# Patient Record
Sex: FEMALE | Race: WHITE | Employment: UNEMPLOYED
[De-identification: names, ages, dates, MRNs, and addresses within clinical notes are randomized per-mention and may not be internally consistent; named-entity substitution may affect disease eponyms.]

---

## 2022-03-18 PROBLEM — G93.2 IIH (IDIOPATHIC INTRACRANIAL HYPERTENSION): Status: ACTIVE | Noted: 2017-10-04

## 2022-03-18 PROBLEM — E66.01 OBESITY, MORBID (HCC): Status: ACTIVE | Noted: 2018-08-31

## 2022-03-20 PROBLEM — R51.9 HEADACHE AROUND THE EYES: Status: ACTIVE | Noted: 2017-10-04

## 2022-09-12 ENCOUNTER — TELEPHONE (OUTPATIENT)
Dept: FAMILY MEDICINE CLINIC | Facility: CLINIC | Age: 27
End: 2022-09-12

## 2022-09-12 NOTE — TELEPHONE ENCOUNTER
Patient had a New patient OV scheduled , Patient brought her baby and 2 other kids to her appointment. Per GENNA Khan and [facility] : She won't be able to have them all in a room with her, unless they are the ones with the appt. We are still under covid protocols. Per patient she was not advised of this during scheduling .

## 2022-09-27 ENCOUNTER — OFFICE VISIT (OUTPATIENT)
Dept: FAMILY MEDICINE CLINIC | Facility: CLINIC | Age: 27
End: 2022-09-27
Payer: MEDICARE

## 2022-09-27 VITALS
SYSTOLIC BLOOD PRESSURE: 118 MMHG | RESPIRATION RATE: 18 BRPM | TEMPERATURE: 98.3 F | DIASTOLIC BLOOD PRESSURE: 82 MMHG | WEIGHT: 293 LBS | HEART RATE: 86 BPM | BODY MASS INDEX: 47.09 KG/M2 | HEIGHT: 66 IN | OXYGEN SATURATION: 97 %

## 2022-09-27 DIAGNOSIS — Z13.0 SCREENING FOR DEFICIENCY ANEMIA: ICD-10-CM

## 2022-09-27 DIAGNOSIS — Z13.220 ENCOUNTER FOR SCREENING FOR LIPID DISORDER: ICD-10-CM

## 2022-09-27 DIAGNOSIS — Z13.1 SCREENING FOR DIABETES MELLITUS (DM): ICD-10-CM

## 2022-09-27 DIAGNOSIS — R63.5 WEIGHT GAIN: ICD-10-CM

## 2022-09-27 DIAGNOSIS — E66.01 OBESITY, MORBID (HCC): ICD-10-CM

## 2022-09-27 DIAGNOSIS — E01.0 THYROMEGALY: ICD-10-CM

## 2022-09-27 DIAGNOSIS — Z00.00 PREVENTATIVE HEALTH CARE: Primary | ICD-10-CM

## 2022-09-27 PROCEDURE — 99395 PREV VISIT EST AGE 18-39: CPT | Performed by: FAMILY MEDICINE

## 2022-09-27 ASSESSMENT — PATIENT HEALTH QUESTIONNAIRE - PHQ9
SUM OF ALL RESPONSES TO PHQ QUESTIONS 1-9: 0
SUM OF ALL RESPONSES TO PHQ QUESTIONS 1-9: 0
2. FEELING DOWN, DEPRESSED OR HOPELESS: 0
SUM OF ALL RESPONSES TO PHQ QUESTIONS 1-9: 0
SUM OF ALL RESPONSES TO PHQ QUESTIONS 1-9: 0
SUM OF ALL RESPONSES TO PHQ9 QUESTIONS 1 & 2: 0
1. LITTLE INTEREST OR PLEASURE IN DOING THINGS: 0

## 2022-09-27 ASSESSMENT — ENCOUNTER SYMPTOMS
COUGH: 0
DIARRHEA: 0
NAUSEA: 0
SHORTNESS OF BREATH: 0
VOMITING: 0

## 2022-09-27 NOTE — PROGRESS NOTES
Ivis Saul (:  1995) is a 23 y.o. female, NEW patient, here for evaluation of the following chief complaint(s): would like CPE with labs  Other (Issues with weight gain/ possible pcos. )         ASSESSMENT/PLAN:  1. Preventative health care  -     Comprehensive Metabolic Panel; Future  -     CBC with Auto Differential; Future  -     Lipid Panel; Future  2. Screening for diabetes mellitus (DM)  -     Comprehensive Metabolic Panel; Future  3. Encounter for screening for lipid disorder  -     Lipid Panel; Future  4. Screening for deficiency anemia  -     CBC with Auto Differential; Future  5. Thyromegaly  -     TSH; Future  -     US HEAD NECK SOFT TISSUE THYROID; Future  6. Obesity, morbid ([address]  Assessment & Plan:   Uncontrolled, lifestyle modifications recommended  Orders:  -     Testosterone, free, total; Future  7. Weight gain  -     Testosterone, free, total; Future    Fu pending lab/xray results         Subjective   SUBJECTIVE/OBJECTIVE:  Other  This is a chronic problem. The current episode started more than 1 year ago. The problem occurs constantly. The problem has been gradually worsening. Pertinent negatives include no chest pain, chills, coughing, fatigue, nausea or vomiting. Review of Systems   Constitutional:  Negative for chills and fatigue. Respiratory:  Negative for cough and shortness of breath. Cardiovascular:  Negative for chest pain and leg swelling. Gastrointestinal:  Negative for diarrhea, nausea and vomiting. Objective   Physical Exam  Vitals and nursing note reviewed. Constitutional:       General: She is not in acute distress. Appearance: Normal appearance. She is obese. She is not ill-appearing. HENT:      Head: Normocephalic and atraumatic. Right Ear: Tympanic membrane, ear canal and external ear normal.      Left Ear: Tympanic membrane, ear canal and external ear normal.      Nose: Nose normal. No congestion.       Mouth/Throat:      Pharynx: Oropharynx is clear. No posterior oropharyngeal erythema. Eyes:      Extraocular Movements: Extraocular movements intact. Conjunctiva/sclera: Conjunctivae normal.      Pupils: Pupils are equal, round, and reactive to light. Neck:      Comments: Thyroid feels enlarged  Cardiovascular:      Rate and Rhythm: Normal rate and regular rhythm. Pulses: Normal pulses. Heart sounds: Normal heart sounds. No murmur heard. No friction rub. No gallop. Pulmonary:      Effort: Pulmonary effort is normal. No respiratory distress. Breath sounds: Normal breath sounds. No wheezing, rhonchi or rales. Abdominal:      General: Bowel sounds are normal. There is no distension. Palpations: Abdomen is soft. There is no mass. Tenderness: There is no abdominal tenderness. There is no right CVA tenderness, left CVA tenderness, guarding or rebound. Hernia: No hernia is present. Musculoskeletal:         General: No swelling or tenderness. Normal range of motion. Cervical back: Normal range of motion and neck supple. Skin:     General: Skin is warm and dry. Neurological:      General: No focal deficit present. Mental Status: She is alert. Mental status is at baseline. Cranial Nerves: No cranial nerve deficit. Sensory: No sensory deficit. Motor: No weakness. Coordination: Coordination normal.      Gait: Gait normal.      Deep Tendon Reflexes: Reflexes normal.   Psychiatric:         Mood and Affect: Mood normal.         Behavior: Behavior normal.         Thought Content: Thought content normal.                An electronic signature was used to authenticate this note.     --Grady Redmond MD

## 2022-09-29 ENCOUNTER — HOSPITAL ENCOUNTER (OUTPATIENT)
Dept: ULTRASOUND IMAGING | Age: 27
Discharge: HOME | End: 2022-10-02
Payer: MEDICARE

## 2022-09-29 DIAGNOSIS — E01.0 THYROMEGALY: ICD-10-CM

## 2022-09-29 DIAGNOSIS — E04.1 LEFT THYROID NODULE: Primary | ICD-10-CM

## 2022-09-29 PROCEDURE — 76536 US EXAM OF HEAD AND NECK: CPT

## 2022-09-30 DIAGNOSIS — Z13.220 ENCOUNTER FOR SCREENING FOR LIPID DISORDER: ICD-10-CM

## 2022-09-30 DIAGNOSIS — Z13.1 SCREENING FOR DIABETES MELLITUS (DM): ICD-10-CM

## 2022-09-30 DIAGNOSIS — Z13.0 SCREENING FOR DEFICIENCY ANEMIA: ICD-10-CM

## 2022-09-30 DIAGNOSIS — E01.0 THYROMEGALY: ICD-10-CM

## 2022-09-30 DIAGNOSIS — R63.5 WEIGHT GAIN: ICD-10-CM

## 2022-09-30 DIAGNOSIS — E66.01 OBESITY, MORBID (HCC): ICD-10-CM

## 2022-09-30 DIAGNOSIS — Z00.00 PREVENTATIVE HEALTH CARE: ICD-10-CM

## 2022-10-25 ENCOUNTER — TELEPHONE (OUTPATIENT)
Dept: FAMILY MEDICINE CLINIC | Facility: CLINIC | Age: 27
End: 2022-10-25

## 2022-11-17 ENCOUNTER — OFFICE VISIT (OUTPATIENT)
Dept: ENDOCRINOLOGY | Age: 27
End: 2022-11-17
Payer: MEDICARE

## 2022-11-17 VITALS
DIASTOLIC BLOOD PRESSURE: 90 MMHG | SYSTOLIC BLOOD PRESSURE: 128 MMHG | OXYGEN SATURATION: 97 % | WEIGHT: 293 LBS | HEIGHT: 65 IN | BODY MASS INDEX: 48.82 KG/M2 | HEART RATE: 103 BPM

## 2022-11-17 DIAGNOSIS — E04.1 THYROID NODULE: ICD-10-CM

## 2022-11-17 DIAGNOSIS — Z80.8 FAMILY HISTORY OF THYROID CANCER: ICD-10-CM

## 2022-11-17 LAB — TSH W FREE THYROID IF ABNORMAL: 1.3 UIU/ML (ref 0.36–3.74)

## 2022-11-17 PROCEDURE — 99204 OFFICE O/P NEW MOD 45 MIN: CPT | Performed by: INTERNAL MEDICINE

## 2022-11-17 PROCEDURE — G8427 DOCREV CUR MEDS BY ELIG CLIN: HCPCS | Performed by: INTERNAL MEDICINE

## 2022-11-17 PROCEDURE — G8417 CALC BMI ABV UP PARAM F/U: HCPCS | Performed by: INTERNAL MEDICINE

## 2022-11-17 PROCEDURE — 4004F PT TOBACCO SCREEN RCVD TLK: CPT | Performed by: INTERNAL MEDICINE

## 2022-11-17 PROCEDURE — G8484 FLU IMMUNIZE NO ADMIN: HCPCS | Performed by: INTERNAL MEDICINE

## 2022-11-17 PROCEDURE — 10005 FNA BX W/US GDN 1ST LES: CPT | Performed by: INTERNAL MEDICINE

## 2022-11-17 RX ORDER — IBUPROFEN 200 MG
200 TABLET ORAL EVERY 6 HOURS PRN
COMMUNITY

## 2022-11-17 ASSESSMENT — ENCOUNTER SYMPTOMS
CONSTIPATION: 0
DIARRHEA: 1

## 2022-11-17 NOTE — PROGRESS NOTES
Osman Carvalho MD, [facility]  [address], [address] [address]  Phone [phone]          Aníbal Ji is a 33 y.o. female seen 11/17/2022 at the request of Dr. Jaylene Hernandez for the evaluation of left thyroid nodule        ASSESSMENT AND PLAN:    1. Thyroid nodule  I performed an FNA biopsy of the suspicious mid-inferior left lobe nodule today. The specimen will be sent to [facility] for cytology with [facility] genomic sequencing  if needed. Assuming the biopsy is benign, I will have her return for a follow up ultrasound in 4 months to document stability. She has no obvious compressive symptoms at this point which would warrant referral for thyroidectomy. I will assess her thyroid function today. 2. Family history of thyroid cancer  She has a positive family history of thyroid cancer in her brother and maternal grandmother. Her brother received radioactive iodine, indicating that he had a differentiated follicular derived thyroid cancer (papillary or follicular thyroid carcinoma). Procedures:    Limited thyroid ultrasound 11/17/2022: In the mid to inferior left lobe there is a solid, heterogeneous nodule with isoechoic and hypoechoic components measuring 1.84 x 2.27 x 2.68 cm containing multiple punctate echogenic foci suspicious for microcalcifications (TR 5). Examination of the central and lateral cervical compartments reveals no abnormal lymph nodes bilaterally. Thyroid FNA Biopsy Procedure Note:    Informed consent was obtained from the patient. I explained the small risk of bleeding and infection. A timeout was performed. The neck was cleansed using alcohol pads. The skin was anesthetized using 1% lidocaine. 5 passes with a 27-gauge needle were made into the mid-inferior left lobe nodule using ultrasound-guidance. The needle was visualized in the nodule on all attempts.   The material from 3 passes was placed in CytoLyt solution and 2 passes into the [employer] [address] tube. The patient tolerated the procedure well. Post-procedure ultrasound revealed no evidence of swelling or hemorrhage. The biopsy site was covered with a bandaid. The patient was advised to call with swelling, dysphagia, excessive bruising or severe neck pain. Follow-up and Dispositions    Return in about 4 months (around 3/17/2023). HISTORY OF PRESENT ILLNESS:    THYROID NODULE / MULTINODULAR GOITER    Presentation: Thyromegaly noted on examination by primary care physician. Thyroid Cancer Risk Factors:  Her brother had metastatic thyroid carcinoma to cervical lymph nodes requiring radioactive iodine. Her maternal grandmother had thyroid cancer. There is no history of radiation to the head/neck. Symptoms:  She states that the right side of her neck feels enlarged compared to the left. Denies anterior neck pain/pressure. She has had intermittent dysphagia and a dry cough. Denies positional shortness of breath, hoarseness. Imaging:  Thyroid ultrasound 9/29/2021: Right lobe 5.9 x 1.5 x 1.2 cm, homogeneous echotexture, no nodules. Left lobe 6.1 x 2.4 x 2.6 cm. There is a mostly hypoechoic nodule measuring 2.7 x 2.5 cm with irregular margins and sate echogenic areas which may represent calcifications. Limited thyroid ultrasound 11/17/2022: In the mid to inferior left lobe there is a solid, heterogeneous nodule with isoechoic and hypoechoic components measuring 1.84 x 2.27 x 2.68 cm containing multiple punctate echogenic foci suspicious for microcalcifications (TR 5). Examination of the central and lateral cervical compartments reveals no abnormal lymph nodes bilaterally. Labs:      Review of Systems   Constitutional:  Positive for fatigue. Negative for diaphoresis. Weight increased 50 pounds the past year. She had a baby 9/2021. Cardiovascular:  Negative for palpitations.    Gastrointestinal: Positive for diarrhea (after eating). Negative for constipation. Endocrine: Negative for cold intolerance and heat intolerance. Genitourinary:  Positive for menstrual problem (irregular; she has had 3 childtren s/p tubal ligation). Neurological:  Negative for tremors. Psychiatric/Behavioral:  Negative for sleep disturbance. Vital Signs:  BP (!) 128/90   Pulse (!) 103   Ht 5' 5\" (1.651 m)   Wt (!) 316 lb (143.3 kg)   SpO2 97%   BMI 52.59 kg/m²     Wt Readings from Last 3 Encounters:   11/17/22 (!) 316 lb (143.3 kg)   09/27/22 (!) 314 lb (142.4 kg)       Physical Exam  Constitutional:       General: She is not in acute distress. Neck:      Comments: Left lobe of the thyroid gland full compared to the right lobe with a probable palpable nodule measuring 2.5 cm. Cardiovascular:      Rate and Rhythm: Normal rate and regular rhythm. Lymphadenopathy:      Cervical: No cervical adenopathy. Neurological:      Motor: No tremor. Orders Placed This Encounter   Procedures    TSH with Reflex     Standing Status:   Future     Standing Expiration Date:   11/17/2023    DC FINE NEEDLE ASPIRATION BX W/US GDN 1ST LESION         Current Outpatient Medications   Medication Sig Dispense Refill    ibuprofen (ADVIL;MOTRIN) 200 MG tablet Take 200 mg by mouth every 6 hours as needed for Pain       No current facility-administered medications for this visit.

## 2022-11-22 ENCOUNTER — TELEPHONE (OUTPATIENT)
Dept: ENDOCRINOLOGY | Age: 27
End: 2022-11-22

## 2022-11-22 NOTE — TELEPHONE ENCOUNTER
Dr. Laura Ramos? [facility] would like a call back about the patient's biopsy results.  Phone number is [phone]

## 2022-11-23 ENCOUNTER — TELEPHONE (OUTPATIENT)
Dept: ENDOCRINOLOGY | Age: 27
End: 2022-11-23

## 2022-11-23 DIAGNOSIS — C73 PAPILLARY THYROID CARCINOMA (HCC): ICD-10-CM

## 2022-11-23 NOTE — TELEPHONE ENCOUNTER
I spoke with the patient regarding her biopsy results: Papillary thyroid carcinoma. I will refer her to Dr. Lana Jo for total thyroidectomy.

## 2022-11-23 NOTE — TELEPHONE ENCOUNTER
Dr. Nidia Luo notified that Dr. Abdullahi Castaneda received her biopsy results and has spoken to the patient.

## 2022-12-07 ENCOUNTER — OFFICE VISIT (OUTPATIENT)
Dept: ENT CLINIC | Age: 27
End: 2022-12-07
Payer: MEDICARE

## 2022-12-07 VITALS — WEIGHT: 290 LBS | HEIGHT: 66 IN | BODY MASS INDEX: 46.61 KG/M2

## 2022-12-07 DIAGNOSIS — C73 PAPILLARY THYROID CARCINOMA (HCC): Primary | ICD-10-CM

## 2022-12-07 PROCEDURE — 99205 OFFICE O/P NEW HI 60 MIN: CPT | Performed by: OTOLARYNGOLOGY

## 2022-12-07 NOTE — PROGRESS NOTES
Reji Pulido  [facility], [address] [place] [address]  P: [phone]          OFFICE VISIT       12/7/2022    Chief Complaint   Patient presents with    New Patient     Thyroid Concerns. Family history of Thyroid problems. HPI:  Myrna Felix is a 33 y.o. female seen in consultation today for   Chief Complaint   Patient presents with    New Patient     Thyroid Concerns. Family history of Thyroid problems. .     Patient is a 21-year-old female who presents for surgical consultation for left-sided thyroid carcinoma. Endocrine work-up revealed 2 independent solid nodules of the left thyroid gland both measuring greater than 2 cm in size with suspicious sonographic features. Fine-needle aspiration confirmed diagnosis of papillary thyroid carcinoma. Patient is  with 3 children. He works as a . Patient does participate in the [employer]. Of interest she does have a older brother with history of Thyroid Carcinoma. She does not know which type. His presentation was with lymph node metastasis. Current Outpatient Medications:     ibuprofen (ADVIL;MOTRIN) 200 MG tablet, Take 200 mg by mouth every 6 hours as needed for Pain, Disp: , Rfl:     History reviewed. No pertinent past medical history.     Past Surgical History:   Procedure Laterality Date    OTHER SURGICAL HISTORY Left     toe surgery    SHUNT REVISION Right 2016    TUBAL LIGATION          Family History   Problem Relation Age of Onset    No Known Problems Mother     No Known Problems Father     Thyroid Cancer Brother     Thyroid Cancer Maternal Grandmother     Diabetes Paternal Grandmother     Diabetes Paternal Grandfather     Cancer Paternal Grandfather         Social History     Socioeconomic History    Marital status: Single     Spouse name: Not on file    Number of children: Not on file    Years of education: Not on file    Highest education level: Not on file   Occupational History    Not on file   Tobacco Use    Smoking status: Never    Smokeless tobacco: Never   Substance and Sexual Activity    Alcohol use: No    Drug use: Not on file    Sexual activity: Not on file   Other Topics Concern    Not on file   Social History Narrative    Not on file     Social Determinants of Health     Financial Resource Strain: Not on file   Food Insecurity: Not on file   Transportation Needs: Not on file   Physical Activity: Not on file   Stress: Not on file   Social Connections: Not on file   Intimate Partner Violence: Not on file   Housing Stability: Not on file        No Known Allergies     ROS:  The patient was asked specifically about the following. General: Fever, chills, night sweats, unexplained weight loss or weight gain  Eyes: Blurry vision, double vision, floaters, loss or decrease of peripheral vision.    Ears: Difficulty hearing, ringing or buzzing in the ears, ear fullness, frequent ear infections, ear pain or drainage, hearing aid  Nose/Face: Drainage, frequent nosebleeds, sinus pain, pressure or fullness, difficulty breathing through the nose, facial pain, swelling or masses  Mouth: Sores in mouth, tongue soreness, bleeding gums, wears dentures, growths in mouth  Throat: Sore throat, hoarseness, difficulty swallowing, lump in throat, sore throat frequency  Respiratory: Difficulty breathing, frequent cough, productive cough, wheezing, recent abnormal chest X-RAY  Cardiovascular: Blocked arteries, chest pain, shortness of breath, abnormal heart beat, pacemaker  Digestive: Frequent indigestion, burning in throat,chest or stomach after a meal, burning that wakes you in the night, abdominal pain  Neuropsychiatric: Headaches, seizures, facial numbness or tingling, weakness, depressed mood or feeling sad, anxiety, inability to cope  Hematologic/Lymphatic: Anemia, easy bruising or bleeding, swollen glands, transfusions  Allergy/Immunologic: Hay fever, environmental allergies, food allergies, allergy testing, immunodeficiency  Endocrine: Heat or cold intolerance, fatigue, heart racing, profuse sweating, thyroid swelling, over or underactive thyroid, pituitary problems  Other: other problems not mentioned  All systems were negative with the exception of the following pertinent positives: Fatigue, postpartum depression    Vitals: There were no vitals filed for this visit. PHYSICAL EXAM: A comprehensive physical exam was performed in the following manner. Unless otherwise indicated in pertinent findings section below, findings were within normal limits. APPEARANCE:   General assessment for development status, nutritional status, and for pain or distress was performed. COMMUNICATION:   Ability to communicate effectively including vocal quality was assessed. HEAD AND FACE:   General exam of the face and scalp for any gross masses or lesions was performed. Palpation of the sinuses for any sign of pain or tenderness was performed. Facial nerve examination for any facial mimetic muscle asymmetry at rest and with effort was performed. Palpation of the submandibular and parotid glands was performed to assess for asymmetry, nodule or masses. EYES:   Extraocular motility was assessed for medial, lateral, superior and inferior rectus function as well as inferior and superior oblique function. The conjunctiva and eyelids were examined for injection, pallor or swelling. Pupil reactivity and accomodation was assessed. EARS:   External inspection and palpation of the auricular skin and cartilage was performed for lesion or abnormality. Otoscopy of the external auditory and tympanic membranes was performed to assess for patency, induration, erythema, tympanic membrane health and mobility and the presence of any middle ear fluid or abnormality. Speech reception thresholds were grossly assessed through communication at normal conversational levels.      NOSE:   External exam for gross deformity of the nasal bones and upper and lower lateral cartilages was performed. Anterior rhinoscopy was performed to assess the patency of the nasal airway, the anatomy of the nasal septum and turbinates as well as the nasal valve region, and the general mucosal health. The presence of any rhinorrhea and its consistency was noted. Any abnormalities requiring further evaluation by nasal endoscopy will be described below. MOUTH/PHARYNX/LARYNX:   Assessment of the lips, gums, hard/soft palate, tongue, tonsillar fossae and oropharynx for mass, lesions or mucosal abnormalities was performed. The base of tongue and floor of mouth were inspected for lesions and palpated for mass or nodularity. Mirror exam of the larynx to assess for vocal fold mobility and any gross mass or lesion was performed. Mirror exam of the nasopharynx was attempted, to assess for gross mass or lesion of the nasopharynx or any of adenoidal hyperplasia or inflammation. Any abnormalities requiring further examination by flexible endoscopy will be described below. NECK:   Gross inspection of the neck was performed to assess for mass or asymmetry. Palpation of the level I-IV lymph nodes was performed to assess for any grossly enlarged, or abnormally firm lymphadenopathy. The skin of the neck was examined for any induration or swelling and palpated for any crepitus. The larynx and trachea were palpated to assess position in the neck and continuity. The thyroid was palpated to assess for any mass, nodularity or asymmetry. NEURO/PSYCH:   Cranial nerves II-XII were grossly assessed for any weakness or asymmetry. If indicated, CN I was assessed by administration of a standardized smell test (UPSIT). Orientation to person, place and time was assessed. Mood and affect were assessed. RESPIRATION:   Respiratory effort was assessed for increased work of breathing and inspiratory or expiratory wheezing.    Chest expansion was noted for symmetry. CARDIOVASCULAR:   Gross examination for peripheral vascular edema and jugular venous distension was performed. PERTINENT PHYSICAL EXAM FINDINGS - examination for above was grossly within normal limits with exceptions listed below:  Fullness of left thyroid bed. No associated cervical lymphadenopathy appreciated. Demonstrates full vocal range including falsetto. Nasal dorsum significant for allergic salute. Mild bilateral inferior turbinate hypertrophy. Some cobblestoning of posterior pharyngeal mucosa noted. Studies Reviewed  Referral documentation  Thyroid US:  FINDINGS:   Right lobe:  5.9 x 1.5 x 1.2 cm. Homogeneous echotexture. No significant solid   or cystic nodule identified. Left lobe:  6.1 x 2.4 x 2.6 cm. Dominant middle lobe mass. Lesion 1:  2.7 x 2.5 cm mostly hypoechoic mass, irregular margins. Faint   echogenic areas may represent calcifications. FNA Path:   Available in media. Consistent with papillary thyroid carcinoma         ASSESSMENT AND PLAN:     Diagnosis Orders   1. Papillary thyroid carcinoma [facility])             Had extensive discussion with patient and her  today regarding risks benefits and alternatives to thyroidectomy for known papillary thyroid carcinoma. We discussed risks to include temporary or permanent vocal fold paralysis with need for vocal rehabilitation, temporary or permanent hypocalcemia as well as possible postoperative hematoma or seroma. Recommend total thyroidectomy with likely postoperative radioactive iodine ablation. Recommend overnight admission for pain control and monitoring calcium levels as well as drain output. Please schedule accordingly. The patient diagnoses and management plan were discussed at length. They  demonstrated an understanding of the plan and stated that all questions were answered to their satisfaction.        PATIENT EDUCATION / INSTRUCTIONS GIVEN FOR:  Total thyroidectomy    Please CC Dr. Collins Bingham MD. Thank you.

## 2022-12-08 ENCOUNTER — PREP FOR PROCEDURE (OUTPATIENT)
Dept: ENT CLINIC | Age: 27
End: 2022-12-08

## 2022-12-28 ENCOUNTER — PREP FOR PROCEDURE (OUTPATIENT)
Dept: ENT CLINIC | Age: 27
End: 2022-12-28

## 2022-12-28 DIAGNOSIS — C73 PAPILLARY THYROID CARCINOMA (HCC): Primary | ICD-10-CM

## 2023-01-17 ENCOUNTER — TELEPHONE (OUTPATIENT)
Dept: FAMILY MEDICINE CLINIC | Facility: CLINIC | Age: 28
End: 2023-01-17

## 2023-01-17 NOTE — TELEPHONE ENCOUNTER
----- Message from Carol Copeland sent at 1/17/2023  1:38 PM EST -----  Subject: Message to Provider    QUESTIONS  Information for Provider? Pt is calling in and would like info on the new   injection Semaglutide for weight loss. She is wanting to know how she   would go about getting this med. Please call pt to discuss  ---------------------------------------------------------------------------  --------------  [address] [employer] [facility]  [phone]; OK to leave message on voicemail  ---------------------------------------------------------------------------  --------------  SCRIPT ANSWERS  Relationship to Patient?  Self

## 2023-01-19 NOTE — TELEPHONE ENCOUNTER
Appointment schedule with Dr Jones Alvarenga - 01/25--- patient wants to make sure if this RX can be giving to her ??

## 2023-01-20 NOTE — TELEPHONE ENCOUNTER
These meds have a warning about possible causing a  type of thyroid cancer, so they probably not for her. She still needs fasting labs we ordered in September.  We can discuss Adipex if she would like

## 2023-01-25 ENCOUNTER — OFFICE VISIT (OUTPATIENT)
Dept: FAMILY MEDICINE CLINIC | Facility: CLINIC | Age: 28
End: 2023-01-25

## 2023-01-25 VITALS
BODY MASS INDEX: 47.09 KG/M2 | OXYGEN SATURATION: 98 % | TEMPERATURE: 98 F | SYSTOLIC BLOOD PRESSURE: 126 MMHG | HEART RATE: 67 BPM | WEIGHT: 293 LBS | DIASTOLIC BLOOD PRESSURE: 82 MMHG | HEIGHT: 66 IN

## 2023-01-25 DIAGNOSIS — C73 PAPILLARY THYROID CARCINOMA (HCC): ICD-10-CM

## 2023-01-25 DIAGNOSIS — E66.01 OBESITY, MORBID (HCC): Primary | ICD-10-CM

## 2023-01-25 RX ORDER — PHENTERMINE HYDROCHLORIDE 37.5 MG/1
37.5 TABLET ORAL
Qty: 30 TABLET | Refills: 0 | Status: SHIPPED | OUTPATIENT
Start: 2023-01-25 | End: 2023-02-24

## 2023-01-25 ASSESSMENT — ENCOUNTER SYMPTOMS
COUGH: 0
NAUSEA: 0
VOMITING: 0
DIARRHEA: 0
SHORTNESS OF BREATH: 0

## 2023-01-25 ASSESSMENT — PATIENT HEALTH QUESTIONNAIRE - PHQ9
1. LITTLE INTEREST OR PLEASURE IN DOING THINGS: 0
2. FEELING DOWN, DEPRESSED OR HOPELESS: 0
SUM OF ALL RESPONSES TO PHQ QUESTIONS 1-9: 0
SUM OF ALL RESPONSES TO PHQ QUESTIONS 1-9: 0
SUM OF ALL RESPONSES TO PHQ9 QUESTIONS 1 & 2: 0
SUM OF ALL RESPONSES TO PHQ QUESTIONS 1-9: 0
SUM OF ALL RESPONSES TO PHQ QUESTIONS 1-9: 0

## 2023-01-25 NOTE — PROGRESS NOTES
Amina Phan (:  1995) is a 33 y.o. female,Established patient, here for evaluation of the following chief complaint(s):  Weight Loss (DISCUSS WEIGHT LOSS MEDICATION) Was interested in Wegovy/ozempic, but has thyroid cancer, so I am avioding that class of drug with her. ASSESSMENT/PLAN:  1. Obesity, morbid ([address]  Assessment & Plan:   Uncontrolled, lifestyle modifications recommended. Will start Adipex and fu here in 4 weeks. Pt to get on diet plan now and unddeerstands the med will not make her lose weight but can help her stay on diet and not cheat  Orders:  -     phentermine (ADIPEX-P) 37.5 MG tablet; Take 1 tablet by mouth every morning (before breakfast) for 30 days. Max Daily Amount: 37.5 mg, Disp-30 tablet, R-0Normal  2. Papillary thyroid carcinoma ([facility] [address]- fu with endo      Return in about 4 weeks (around 2023) for office followup/recheck, NOT VV. Subjective   SUBJECTIVE/OBJECTIVE:  Other  This is a chronic problem. The current episode started more than 1 year ago. The problem occurs constantly. The problem has been gradually worsening. Pertinent negatives include no chest pain, chills, coughing, fatigue, nausea or vomiting. Review of Systems   Constitutional:  Negative for chills and fatigue. Respiratory:  Negative for cough and shortness of breath. Cardiovascular:  Negative for chest pain and leg swelling. Gastrointestinal:  Negative for diarrhea, nausea and vomiting. Objective   Physical Exam  Vitals and nursing note reviewed. Constitutional:       General: She is not in acute distress. Appearance: Normal appearance. She is obese. HENT:      Head: Normocephalic and atraumatic. Nose: Nose normal.      Mouth/Throat:      Pharynx: Oropharynx is clear. Eyes:      Extraocular Movements: Extraocular movements intact. Conjunctiva/sclera: Conjunctivae normal.   Cardiovascular:      Rate and Rhythm: Normal rate and regular rhythm. Pulses: Normal pulses. Heart sounds: Normal heart sounds. Pulmonary:      Effort: Pulmonary effort is normal.      Breath sounds: Normal breath sounds. Musculoskeletal:         General: No swelling or tenderness. Normal range of motion. Cervical back: Normal range of motion and neck supple. Skin:     General: Skin is warm and dry. Neurological:      General: No focal deficit present. Mental Status: She is alert. Mental status is at baseline. Psychiatric:         Mood and Affect: Mood normal.         Behavior: Behavior normal.              An electronic signature was used to authenticate this note.     --Robbin Luo MD

## 2023-02-14 NOTE — PERIOP NOTE
Patient verified name and . Order for consent found in EHR and matches case posting; patient verifies procedure. Type 2 surgery, PAT phone assessment complete. Orders received. Labs per surgeon: none  Labs per anesthesia protocol: Hgb- Pt instructed to come for lab work (Monday- Friday 8:00 AM- 3:30 PM) prior to surgery day. Pt voiced an understanding. Patient answered medical/surgical history questions at their best of ability. All prior to admission medications documented in [facility]. Patient instructed to take the following medications the day of surgery according to anesthesia guidelines with a small sip of water: none. On the day before surgery please take Acetaminophen 1000mg in the morning and then again before bed. You may substitute for Tylenol 650 mg. Hold all vitamins 7 days prior to surgery and NSAIDS 5 days prior to surgery. Pt instructed to stop Adipex five days prior to surgery. Patient instructed on the following:    > Arrive at A Entrance, time of arrival to be called the day before by 1700  > NPO after midnight, unless otherwise indicated, including gum, mints, and ice chips  > Responsible adult must drive patient to the hospital, stay during surgery, and patient will need supervision 24 hours after anesthesia  > Use antibacterial soap in shower the night before surgery and on the morning of surgery  > All piercings must be removed prior to arrival.    > Leave all valuables (money and jewelry) at home but bring insurance card and ID on DOS.   > You may be required to pay a deductible or co-pay on the day of your procedure. You can pre-pay by calling [phone] if your surgery is at the [facility] or [phone] if your surgery is at the [facility]. > Do not wear make-up, nail polish, lotions, cologne, perfumes, powders, or oil on skin. Artificial nails are not permitted. E-mail with surgical instructions sent to [email or web address] per pt request.

## 2023-02-17 ENCOUNTER — HOSPITAL ENCOUNTER (OUTPATIENT)
Dept: LAB | Age: 28
End: 2023-02-17
Payer: MEDICARE

## 2023-02-17 DIAGNOSIS — Z01.818 PRE-OP TESTING: ICD-10-CM

## 2023-02-17 LAB — HGB BLD-MCNC: 14.8 G/DL (ref 11.7–15.4)

## 2023-02-17 PROCEDURE — 36415 COLL VENOUS BLD VENIPUNCTURE: CPT

## 2023-02-17 PROCEDURE — 85018 HEMOGLOBIN: CPT

## 2023-02-19 ENCOUNTER — ANESTHESIA EVENT (OUTPATIENT)
Dept: SURGERY | Age: 28
End: 2023-02-19
Payer: MEDICARE

## 2023-02-20 ENCOUNTER — HOSPITAL ENCOUNTER (OUTPATIENT)
Age: 28
Setting detail: OBSERVATION
Discharge: HOME | End: 2023-02-21
Attending: OTOLARYNGOLOGY | Admitting: OTOLARYNGOLOGY
Payer: MEDICARE

## 2023-02-20 ENCOUNTER — ANESTHESIA (OUTPATIENT)
Dept: SURGERY | Age: 28
End: 2023-02-20
Payer: MEDICARE

## 2023-02-20 DIAGNOSIS — C73 PAPILLARY THYROID CARCINOMA (HCC): ICD-10-CM

## 2023-02-20 DIAGNOSIS — Z01.818 PRE-OP TESTING: Primary | ICD-10-CM

## 2023-02-20 DIAGNOSIS — G89.18 POST-OP PAIN: ICD-10-CM

## 2023-02-20 LAB
ABO + RH BLD: NORMAL
BLOOD GROUP ANTIBODIES SERPL: NORMAL
CALCIUM SERPL-MCNC: 8.4 MG/DL (ref 8.3–10.4)
PTH-INTACT SERPL-MCNC: 80.1 PG/ML (ref 18.5–88)
SPECIMEN EXP DATE BLD: NORMAL

## 2023-02-20 PROCEDURE — 86901 BLOOD TYPING SEROLOGIC RH(D): CPT

## 2023-02-20 PROCEDURE — 88307 TISSUE EXAM BY PATHOLOGIST: CPT

## 2023-02-20 PROCEDURE — 7100000001 HC PACU RECOVERY - ADDTL 15 MIN: Performed by: OTOLARYNGOLOGY

## 2023-02-20 PROCEDURE — 60240 REMOVAL OF THYROID: CPT | Performed by: OTOLARYNGOLOGY

## 2023-02-20 PROCEDURE — 3600000004 HC SURGERY LEVEL 4 BASE: Performed by: OTOLARYNGOLOGY

## 2023-02-20 PROCEDURE — 2500000003 HC RX 250 WO HCPCS: Performed by: ANESTHESIOLOGY

## 2023-02-20 PROCEDURE — 3600000014 HC SURGERY LEVEL 4 ADDTL 15MIN: Performed by: OTOLARYNGOLOGY

## 2023-02-20 PROCEDURE — 6360000002 HC RX W HCPCS: Performed by: ANESTHESIOLOGY

## 2023-02-20 PROCEDURE — 83970 ASSAY OF PARATHORMONE: CPT

## 2023-02-20 PROCEDURE — 2720000010 HC SURG SUPPLY STERILE: Performed by: OTOLARYNGOLOGY

## 2023-02-20 PROCEDURE — 2500000003 HC RX 250 WO HCPCS: Performed by: NURSE ANESTHETIST, CERTIFIED REGISTERED

## 2023-02-20 PROCEDURE — 6360000002 HC RX W HCPCS: Performed by: OTOLARYNGOLOGY

## 2023-02-20 PROCEDURE — 6360000002 HC RX W HCPCS: Performed by: NURSE ANESTHETIST, CERTIFIED REGISTERED

## 2023-02-20 PROCEDURE — 3700000000 HC ANESTHESIA ATTENDED CARE: Performed by: OTOLARYNGOLOGY

## 2023-02-20 PROCEDURE — 6370000000 HC RX 637 (ALT 250 FOR IP): Performed by: ANESTHESIOLOGY

## 2023-02-20 PROCEDURE — 2500000003 HC RX 250 WO HCPCS: Performed by: OTOLARYNGOLOGY

## 2023-02-20 PROCEDURE — 3700000001 HC ADD 15 MINUTES (ANESTHESIA): Performed by: OTOLARYNGOLOGY

## 2023-02-20 PROCEDURE — G0378 HOSPITAL OBSERVATION PER HR: HCPCS

## 2023-02-20 PROCEDURE — 2580000003 HC RX 258: Performed by: ANESTHESIOLOGY

## 2023-02-20 PROCEDURE — 7100000000 HC PACU RECOVERY - FIRST 15 MIN: Performed by: OTOLARYNGOLOGY

## 2023-02-20 PROCEDURE — 2709999900 HC NON-CHARGEABLE SUPPLY: Performed by: OTOLARYNGOLOGY

## 2023-02-20 RX ORDER — DIPHENHYDRAMINE HYDROCHLORIDE 50 MG/ML
12.5 INJECTION INTRAMUSCULAR; INTRAVENOUS
Status: DISCONTINUED | OUTPATIENT
Start: 2023-02-20 | End: 2023-02-20 | Stop reason: HOSPADM

## 2023-02-20 RX ORDER — OXYCODONE HYDROCHLORIDE 5 MG/1
10 TABLET ORAL EVERY 4 HOURS PRN
Status: DISCONTINUED | OUTPATIENT
Start: 2023-02-20 | End: 2023-02-21 | Stop reason: HOSPADM

## 2023-02-20 RX ORDER — PROCHLORPERAZINE EDISYLATE 5 MG/ML
5 INJECTION INTRAMUSCULAR; INTRAVENOUS
Status: COMPLETED | OUTPATIENT
Start: 2023-02-20 | End: 2023-02-20

## 2023-02-20 RX ORDER — HYDROMORPHONE HCL 110MG/55ML
PATIENT CONTROLLED ANALGESIA SYRINGE INTRAVENOUS PRN
Status: DISCONTINUED | OUTPATIENT
Start: 2023-02-20 | End: 2023-02-20 | Stop reason: SDUPTHER

## 2023-02-20 RX ORDER — LIDOCAINE HYDROCHLORIDE 10 MG/ML
1 INJECTION, SOLUTION INFILTRATION; PERINEURAL
Status: COMPLETED | OUTPATIENT
Start: 2023-02-20 | End: 2023-02-20

## 2023-02-20 RX ORDER — CEFUROXIME AXETIL 250 MG/1
500 TABLET ORAL EVERY 12 HOURS SCHEDULED
Status: DISCONTINUED | OUTPATIENT
Start: 2023-02-20 | End: 2023-02-21 | Stop reason: HOSPADM

## 2023-02-20 RX ORDER — KETAMINE HYDROCHLORIDE 50 MG/ML
INJECTION, SOLUTION, CONCENTRATE INTRAMUSCULAR; INTRAVENOUS PRN
Status: DISCONTINUED | OUTPATIENT
Start: 2023-02-20 | End: 2023-02-20 | Stop reason: SDUPTHER

## 2023-02-20 RX ORDER — CALCIUM CARBONATE 500(1250)
1000 TABLET ORAL 2 TIMES DAILY
Status: DISCONTINUED | OUTPATIENT
Start: 2023-02-20 | End: 2023-02-21 | Stop reason: HOSPADM

## 2023-02-20 RX ORDER — LEVOTHYROXINE SODIUM 0.07 MG/1
150 TABLET ORAL DAILY
Status: DISCONTINUED | OUTPATIENT
Start: 2023-02-21 | End: 2023-02-21 | Stop reason: HOSPADM

## 2023-02-20 RX ORDER — APREPITANT 40 MG/1
40 CAPSULE ORAL ONCE
Status: COMPLETED | OUTPATIENT
Start: 2023-02-20 | End: 2023-02-20

## 2023-02-20 RX ORDER — SODIUM CHLORIDE, SODIUM LACTATE, POTASSIUM CHLORIDE, CALCIUM CHLORIDE 600; 310; 30; 20 MG/100ML; MG/100ML; MG/100ML; MG/100ML
INJECTION, SOLUTION INTRAVENOUS CONTINUOUS
Status: DISCONTINUED | OUTPATIENT
Start: 2023-02-20 | End: 2023-02-20 | Stop reason: HOSPADM

## 2023-02-20 RX ORDER — SODIUM CHLORIDE 0.9 % (FLUSH) 0.9 %
5-40 SYRINGE (ML) INJECTION PRN
Status: DISCONTINUED | OUTPATIENT
Start: 2023-02-20 | End: 2023-02-21 | Stop reason: HOSPADM

## 2023-02-20 RX ORDER — SODIUM CHLORIDE 0.9 % (FLUSH) 0.9 %
5-40 SYRINGE (ML) INJECTION PRN
Status: DISCONTINUED | OUTPATIENT
Start: 2023-02-20 | End: 2023-02-20 | Stop reason: HOSPADM

## 2023-02-20 RX ORDER — SODIUM CHLORIDE 0.9 % (FLUSH) 0.9 %
5-40 SYRINGE (ML) INJECTION EVERY 12 HOURS SCHEDULED
Status: DISCONTINUED | OUTPATIENT
Start: 2023-02-20 | End: 2023-02-21 | Stop reason: HOSPADM

## 2023-02-20 RX ORDER — EPINEPHRINE 1 MG/ML(1)
AMPUL (ML) INJECTION PRN
Status: DISCONTINUED | OUTPATIENT
Start: 2023-02-20 | End: 2023-02-20 | Stop reason: HOSPADM

## 2023-02-20 RX ORDER — ONDANSETRON 2 MG/ML
4 INJECTION INTRAMUSCULAR; INTRAVENOUS EVERY 6 HOURS PRN
Status: DISCONTINUED | OUTPATIENT
Start: 2023-02-20 | End: 2023-02-21 | Stop reason: HOSPADM

## 2023-02-20 RX ORDER — LIDOCAINE HYDROCHLORIDE AND EPINEPHRINE 10; 10 MG/ML; UG/ML
INJECTION, SOLUTION INFILTRATION; PERINEURAL PRN
Status: DISCONTINUED | OUTPATIENT
Start: 2023-02-20 | End: 2023-02-20 | Stop reason: HOSPADM

## 2023-02-20 RX ORDER — DEXTROSE MONOHYDRATE 100 MG/ML
INJECTION, SOLUTION INTRAVENOUS CONTINUOUS PRN
Status: DISCONTINUED | OUTPATIENT
Start: 2023-02-20 | End: 2023-02-20 | Stop reason: HOSPADM

## 2023-02-20 RX ORDER — SODIUM CHLORIDE 9 MG/ML
INJECTION, SOLUTION INTRAVENOUS PRN
Status: DISCONTINUED | OUTPATIENT
Start: 2023-02-20 | End: 2023-02-20 | Stop reason: HOSPADM

## 2023-02-20 RX ORDER — SODIUM CHLORIDE 0.9 % (FLUSH) 0.9 %
5-40 SYRINGE (ML) INJECTION EVERY 12 HOURS SCHEDULED
Status: DISCONTINUED | OUTPATIENT
Start: 2023-02-20 | End: 2023-02-20 | Stop reason: HOSPADM

## 2023-02-20 RX ORDER — FENTANYL CITRATE 50 UG/ML
100 INJECTION, SOLUTION INTRAMUSCULAR; INTRAVENOUS
Status: DISCONTINUED | OUTPATIENT
Start: 2023-02-20 | End: 2023-02-20 | Stop reason: HOSPADM

## 2023-02-20 RX ORDER — SODIUM CHLORIDE 9 MG/ML
INJECTION, SOLUTION INTRAVENOUS PRN
Status: DISCONTINUED | OUTPATIENT
Start: 2023-02-20 | End: 2023-02-21 | Stop reason: HOSPADM

## 2023-02-20 RX ORDER — PROPOFOL 10 MG/ML
INJECTION, EMULSION INTRAVENOUS PRN
Status: DISCONTINUED | OUTPATIENT
Start: 2023-02-20 | End: 2023-02-20 | Stop reason: SDUPTHER

## 2023-02-20 RX ORDER — EPHEDRINE SULFATE/0.9% NACL/PF 50 MG/5 ML
SYRINGE (ML) INTRAVENOUS PRN
Status: DISCONTINUED | OUTPATIENT
Start: 2023-02-20 | End: 2023-02-20 | Stop reason: SDUPTHER

## 2023-02-20 RX ORDER — ONDANSETRON 4 MG/1
4 TABLET, ORALLY DISINTEGRATING ORAL EVERY 8 HOURS PRN
Status: DISCONTINUED | OUTPATIENT
Start: 2023-02-20 | End: 2023-02-21 | Stop reason: HOSPADM

## 2023-02-20 RX ORDER — ONDANSETRON 2 MG/ML
INJECTION INTRAMUSCULAR; INTRAVENOUS PRN
Status: DISCONTINUED | OUTPATIENT
Start: 2023-02-20 | End: 2023-02-20 | Stop reason: SDUPTHER

## 2023-02-20 RX ORDER — OXYCODONE HYDROCHLORIDE 5 MG/1
10 TABLET ORAL ONCE
Status: COMPLETED | OUTPATIENT
Start: 2023-02-20 | End: 2023-02-20

## 2023-02-20 RX ORDER — ACETAMINOPHEN 500 MG
1000 TABLET ORAL ONCE
Status: COMPLETED | OUTPATIENT
Start: 2023-02-20 | End: 2023-02-20

## 2023-02-20 RX ORDER — SODIUM CHLORIDE, SODIUM LACTATE, POTASSIUM CHLORIDE, CALCIUM CHLORIDE 600; 310; 30; 20 MG/100ML; MG/100ML; MG/100ML; MG/100ML
INJECTION, SOLUTION INTRAVENOUS CONTINUOUS
Status: DISCONTINUED | OUTPATIENT
Start: 2023-02-20 | End: 2023-02-21 | Stop reason: HOSPADM

## 2023-02-20 RX ORDER — LIDOCAINE HYDROCHLORIDE 20 MG/ML
INJECTION, SOLUTION EPIDURAL; INFILTRATION; INTRACAUDAL; PERINEURAL PRN
Status: DISCONTINUED | OUTPATIENT
Start: 2023-02-20 | End: 2023-02-20 | Stop reason: SDUPTHER

## 2023-02-20 RX ORDER — POLYETHYLENE GLYCOL 3350 17 G/17G
17 POWDER, FOR SOLUTION ORAL DAILY PRN
Status: DISCONTINUED | OUTPATIENT
Start: 2023-02-20 | End: 2023-02-21 | Stop reason: HOSPADM

## 2023-02-20 RX ORDER — CALCITRIOL 0.25 UG/1
0.25 CAPSULE, LIQUID FILLED ORAL DAILY
Status: DISCONTINUED | OUTPATIENT
Start: 2023-02-21 | End: 2023-02-21 | Stop reason: HOSPADM

## 2023-02-20 RX ORDER — DEXAMETHASONE SODIUM PHOSPHATE 10 MG/ML
INJECTION, SOLUTION INTRAMUSCULAR; INTRAVENOUS PRN
Status: DISCONTINUED | OUTPATIENT
Start: 2023-02-20 | End: 2023-02-20 | Stop reason: SDUPTHER

## 2023-02-20 RX ORDER — MIDAZOLAM HYDROCHLORIDE 1 MG/ML
2 INJECTION INTRAMUSCULAR; INTRAVENOUS
Status: COMPLETED | OUTPATIENT
Start: 2023-02-20 | End: 2023-02-20

## 2023-02-20 RX ORDER — OXYCODONE HYDROCHLORIDE 5 MG/1
5 TABLET ORAL
Status: DISCONTINUED | OUTPATIENT
Start: 2023-02-20 | End: 2023-02-20 | Stop reason: HOSPADM

## 2023-02-20 RX ORDER — SUCCINYLCHOLINE/SOD CL,ISO/PF 200MG/10ML
SYRINGE (ML) INTRAVENOUS PRN
Status: DISCONTINUED | OUTPATIENT
Start: 2023-02-20 | End: 2023-02-20 | Stop reason: SDUPTHER

## 2023-02-20 RX ORDER — OXYCODONE HYDROCHLORIDE 5 MG/1
5 TABLET ORAL EVERY 4 HOURS PRN
Status: DISCONTINUED | OUTPATIENT
Start: 2023-02-20 | End: 2023-02-21 | Stop reason: HOSPADM

## 2023-02-20 RX ADMIN — MIDAZOLAM 2 MG: 1 INJECTION INTRAMUSCULAR; INTRAVENOUS at 13:33

## 2023-02-20 RX ADMIN — KETAMINE HYDROCHLORIDE 40 MG: 50 INJECTION, SOLUTION INTRAMUSCULAR; INTRAVENOUS at 14:45

## 2023-02-20 RX ADMIN — LIDOCAINE HYDROCHLORIDE 1 ML: 10 INJECTION, SOLUTION INFILTRATION; PERINEURAL at 13:35

## 2023-02-20 RX ADMIN — Medication 200 MG: at 14:09

## 2023-02-20 RX ADMIN — SODIUM CHLORIDE, SODIUM LACTATE, POTASSIUM CHLORIDE, AND CALCIUM CHLORIDE: 600; 310; 30; 20 INJECTION, SOLUTION INTRAVENOUS at 14:00

## 2023-02-20 RX ADMIN — PHENYLEPHRINE HYDROCHLORIDE 100 MCG: 0.1 INJECTION, SOLUTION INTRAVENOUS at 14:57

## 2023-02-20 RX ADMIN — Medication 3000 MG: at 14:31

## 2023-02-20 RX ADMIN — PROPOFOL 300 MG: 10 INJECTION, EMULSION INTRAVENOUS at 14:09

## 2023-02-20 RX ADMIN — OXYCODONE HYDROCHLORIDE 10 MG: 5 TABLET ORAL at 19:15

## 2023-02-20 RX ADMIN — APREPITANT 40 MG: 40 CAPSULE ORAL at 11:45

## 2023-02-20 RX ADMIN — KETAMINE HYDROCHLORIDE 20 MG: 50 INJECTION, SOLUTION INTRAMUSCULAR; INTRAVENOUS at 16:40

## 2023-02-20 RX ADMIN — PHENYLEPHRINE HYDROCHLORIDE 100 MCG: 0.1 INJECTION, SOLUTION INTRAVENOUS at 16:13

## 2023-02-20 RX ADMIN — DEXAMETHASONE SODIUM PHOSPHATE 10 MG: 10 INJECTION INTRAMUSCULAR; INTRAVENOUS at 14:55

## 2023-02-20 RX ADMIN — HYDROMORPHONE HYDROCHLORIDE 0.5 MG: 2 INJECTION INTRAMUSCULAR; INTRAVENOUS; SUBCUTANEOUS at 15:19

## 2023-02-20 RX ADMIN — ONDANSETRON 4 MG: 2 INJECTION INTRAMUSCULAR; INTRAVENOUS at 14:55

## 2023-02-20 RX ADMIN — HYDROMORPHONE HYDROCHLORIDE 0.5 MG: 1 INJECTION, SOLUTION INTRAMUSCULAR; INTRAVENOUS; SUBCUTANEOUS at 18:42

## 2023-02-20 RX ADMIN — Medication 10 MG: at 15:53

## 2023-02-20 RX ADMIN — ACETAMINOPHEN 1000 MG: 500 TABLET, FILM COATED ORAL at 11:45

## 2023-02-20 RX ADMIN — PHENYLEPHRINE HYDROCHLORIDE 50 MCG: 0.1 INJECTION, SOLUTION INTRAVENOUS at 16:55

## 2023-02-20 RX ADMIN — HYDROMORPHONE HYDROCHLORIDE 0.5 MG: 2 INJECTION INTRAMUSCULAR; INTRAVENOUS; SUBCUTANEOUS at 14:45

## 2023-02-20 RX ADMIN — SODIUM CHLORIDE, SODIUM LACTATE, POTASSIUM CHLORIDE, AND CALCIUM CHLORIDE: 600; 310; 30; 20 INJECTION, SOLUTION INTRAVENOUS at 16:30

## 2023-02-20 RX ADMIN — HYDROMORPHONE HYDROCHLORIDE 0.5 MG: 1 INJECTION, SOLUTION INTRAMUSCULAR; INTRAVENOUS; SUBCUTANEOUS at 18:30

## 2023-02-20 RX ADMIN — PHENYLEPHRINE HYDROCHLORIDE 100 MCG: 0.1 INJECTION, SOLUTION INTRAVENOUS at 15:02

## 2023-02-20 RX ADMIN — LIDOCAINE HYDROCHLORIDE 100 MG: 20 INJECTION, SOLUTION EPIDURAL; INFILTRATION; INTRACAUDAL; PERINEURAL at 14:09

## 2023-02-20 RX ADMIN — KETAMINE HYDROCHLORIDE 20 MG: 50 INJECTION, SOLUTION INTRAMUSCULAR; INTRAVENOUS at 15:50

## 2023-02-20 RX ADMIN — PROCHLORPERAZINE EDISYLATE 5 MG: 5 INJECTION INTRAMUSCULAR; INTRAVENOUS at 19:00

## 2023-02-20 RX ADMIN — HYDROMORPHONE HYDROCHLORIDE 1 MG: 2 INJECTION INTRAMUSCULAR; INTRAVENOUS; SUBCUTANEOUS at 14:09

## 2023-02-20 RX ADMIN — SODIUM CHLORIDE, SODIUM LACTATE, POTASSIUM CHLORIDE, AND CALCIUM CHLORIDE: 600; 310; 30; 20 INJECTION, SOLUTION INTRAVENOUS at 13:33

## 2023-02-20 RX ADMIN — SODIUM CHLORIDE, SODIUM LACTATE, POTASSIUM CHLORIDE, AND CALCIUM CHLORIDE: 600; 310; 30; 20 INJECTION, SOLUTION INTRAVENOUS at 14:48

## 2023-02-20 RX ADMIN — PHENYLEPHRINE HYDROCHLORIDE 50 MCG: 0.1 INJECTION, SOLUTION INTRAVENOUS at 16:25

## 2023-02-20 RX ADMIN — Medication 10 MG: at 14:57

## 2023-02-20 ASSESSMENT — PAIN SCALES - GENERAL
PAINLEVEL_OUTOF10: 8
PAINLEVEL_OUTOF10: 0
PAINLEVEL_OUTOF10: 8
PAINLEVEL_OUTOF10: 8
PAINLEVEL_OUTOF10: 0
PAINLEVEL_OUTOF10: 3
PAINLEVEL_OUTOF10: 8

## 2023-02-20 ASSESSMENT — PAIN DESCRIPTION - PAIN TYPE
TYPE: SURGICAL PAIN
TYPE: ACUTE PAIN;SURGICAL PAIN

## 2023-02-20 ASSESSMENT — PAIN DESCRIPTION - LOCATION
LOCATION: INCISION;NECK
LOCATION: INCISION

## 2023-02-20 NOTE — ANESTHESIA PRE PROCEDURE
Department of Anesthesiology  Preprocedure Note       Name:  Wilmar Reece   Age:  33 y.o.  :  1995                                          MRN:  [id number]         Date:  2023      Surgeon: David Hood):  Aravind Pendleton MD    Procedure: Procedure(s):  THYROIDECTOMY metronic nims (nerve monitoring) with laryngeal nerve monitoring    Medications prior to admission:   Prior to Admission medications    Medication Sig Start Date End Date Taking? Authorizing Provider   phentermine (ADIPEX-P) 37.5 MG tablet Take 1 tablet by mouth every morning (before breakfast) for 30 days.  Max Daily Amount: 37.5 mg 23  Juan Francisco Underwood MD   ibuprofen (ADVIL;MOTRIN) 200 MG tablet Take 200 mg by mouth every 6 hours as needed for Pain    Historical Provider, MD       Current medications:    Current Facility-Administered Medications   Medication Dose Route Frequency Provider Last Rate Last Admin    lidocaine 1 % injection 1 mL  1 mL IntraDERmal Once PRN Nicolasa Bingham MD        acetaminophen (TYLENOL) tablet 1,000 mg  1,000 mg Oral Once Nicolasa Bingham MD        fentaNYL (SUBLIMAZE) injection 100 mcg  100 mcg IntraVENous Once PRN Nicolasa Bingham MD        lactated ringers IV soln infusion   IntraVENous Continuous Nicolasa Bingham MD        sodium chloride flush 0.9 % injection 5-40 mL  5-40 mL IntraVENous 2 times per day Nicolasa Bingham MD        sodium chloride flush 0.9 % injection 5-40 mL  5-40 mL IntraVENous PRN Nicolasa Bingham MD        0.9 % sodium chloride infusion   IntraVENous PRN Nicolasa Bingham MD        midazolam (VERSED) injection 2 mg/2mL  2 mg IntraVENous Once PRN Nicolasa Bingham MD        aprepitant (EMEND) capsule 40 mg  40 mg Oral Once Nicolasa Bingham MD           Allergies:  No Known Allergies    Problem List:    Patient Active Problem List   Diagnosis Code    Obesity, morbid ([facility] E66.01    IIH (idiopathic intracranial hypertension) G93.2    Pseudotumor cerebri syndrome G93.2    Pseudotumor cerebri G93.2    Headache around the eyes R51.9    Thyroid nodule E04.1    Family history of thyroid cancer Z80.8    Papillary thyroid carcinoma ([facility] [address] C73       Past Medical History:        Diagnosis Date    PIH (pregnancy induced hypertension)     Pseudotumor     Thyroid cancer ([facility] [address]     STAGE 2 - SURGERY 2/20       Past Surgical History:        Procedure Laterality Date    OTHER SURGICAL HISTORY Left     toe surgery    SHUNT REVISION Right 2016    TUBAL LIGATION         Social History:    Social History     Tobacco Use    Smoking status: Never    Smokeless tobacco: Never   Substance Use Topics    Alcohol use: No                                Counseling given: Not Answered      Vital Signs (Current):   Vitals:    02/14/23 0923 02/20/23 1058   BP:  (!) 157/108   Pulse:  72   Resp:  18   Temp:  97.9 °F (36.6 °C)   TempSrc:  Temporal   SpO2:  96%   Weight: 290 lb (131.5 kg) (!) 331 lb 9.6 oz (150.4 kg)   Height: 5' 5\" (1.651 m)                                               BP Readings from Last 3 Encounters:   02/20/23 (!) 157/108   01/25/23 126/82   11/17/22 (!) 128/90       NPO Status: Time of last liquid consumption: 0900                        Time of last solid consumption: 2300                        Date of last liquid consumption: 02/20/23                        Date of last solid food consumption: 02/19/23    BMI:   Wt Readings from Last 3 Encounters:   02/20/23 (!) 331 lb 9.6 oz (150.4 kg)   01/25/23 (!) 332 lb 11.2 oz (150.9 kg)   12/07/22 290 lb (131.5 kg)     Body mass index is 55.18 kg/m². CBC:   Lab Results   Component Value Date/Time    HGB 14.8 02/17/2023 02:00 PM       CMP: No results found for: NA, K, CL, CO2, BUN, CREATININE, GFRAA, AGRATIO, LABGLOM, GLUCOSE, GLU, PROT, CALCIUM, BILITOT, ALKPHOS, AST, ALT    POC Tests: No results for input(s): POCGLU, POCNA, POCK, POCCL, POCBUN, POCHEMO, POCHCT in the last 72 hours.     Coags: No results found for: PROTIME, INR, APTT    HCG (If Applicable): No results found for: PREGTESTUR, PREGSERUM, HCG, HCGQUANT     ABGs: No results found for: PHART, PO2ART, ONQ2EIE, JTS1NGU, BEART, Y7ECLBCX     Type & Screen (If Applicable):  No results found for: LABABO, LABRH    Drug/Infectious Status (If Applicable):  No results found for: HIV, HEPCAB    COVID-19 Screening (If Applicable): No results found for: COVID19        Anesthesia Evaluation  Patient summary reviewed and Nursing notes reviewed no history of anesthetic complications:   Airway: Mallampati: II  TM distance: >3 FB   Neck ROM: full  Comment: goiter  Mouth opening: > = 3 FB   Dental: normal exam         Pulmonary:Negative Pulmonary ROS breath sounds clear to auscultation                             Cardiovascular:  Exercise tolerance: good (>4 METS),   (+) hypertension:,         Rhythm: regular  Rate: normal                    Neuro/Psych:   (+) headaches:,              ROS comment: Pseudotumor  - has well functioning  Shunt - denied severe headache and neuro sx  GI/Hepatic/Renal:   (+) morbid obesity (super morbid obesity - BMI 55)          Endo/Other:                      ROS comment: Goiter due to papillary thyroid cancer - no obstructive symptoms lying flat Abdominal:             Vascular: Other Findings:           Anesthesia Plan      general     ASA 4     (GETA, NIMS, glidescope )  Induction: intravenous. Anesthetic plan and risks discussed with patient and spouse.                         Sabi Perez MD   2/20/2023

## 2023-02-20 NOTE — PROGRESS NOTES
Pt   Onur) notified per Dr. Bragg Brought \"Surgery is progressing well and we are half way through with the procedure. We had a slow start because of positioning. \"  He verbalized understanding and thanked me for the update.

## 2023-02-20 NOTE — H&P
HPI:  Arslan Ford is a 33 y.o. female seen in consultation today for        Chief Complaint   Patient presents with    New Patient       Thyroid Concerns. Family history of Thyroid problems. .      Patient is a 60-year-old female who presents for surgical consultation for left-sided thyroid carcinoma. Endocrine work-up revealed 2 independent solid nodules of the left thyroid gland both measuring greater than 2 cm in size with suspicious sonographic features. Fine-needle aspiration confirmed diagnosis of papillary thyroid carcinoma. Patient is  with 3 children. He works as a . Patient does participate in the [employer]. Of interest she does have a older brother with history of Thyroid Carcinoma. She does not know which type. His presentation was with lymph node metastasis. Current Medication      Current Outpatient Medications:     ibuprofen (ADVIL;MOTRIN) 200 MG tablet, Take 200 mg by mouth every 6 hours as needed for Pain, Disp: , Rfl:         Past Medical History   History reviewed. No pertinent past medical history.         Past Surgical History         Past Surgical History:   Procedure Laterality Date    OTHER SURGICAL HISTORY Left       toe surgery    SHUNT REVISION Right 2016    TUBAL LIGATION                Family History         Family History   Problem Relation Age of Onset    No Known Problems Mother      No Known Problems Father      Thyroid Cancer Brother      Thyroid Cancer Maternal Grandmother      Diabetes Paternal Grandmother      Diabetes Paternal Grandfather      Cancer Paternal Grandfather              Social History               Socioeconomic History    Marital status: Single       Spouse name: Not on file    Number of children: Not on file    Years of education: Not on file    Highest education level: Not on file   Occupational History    Not on file   Tobacco Use    Smoking status: Never    Smokeless tobacco: Never   Substance and Sexual Activity Alcohol use: No    Drug use: Not on file    Sexual activity: Not on file   Other Topics Concern    Not on file   Social History Narrative    Not on file      Social Determinants of Health      Financial Resource Strain: Not on file   Food Insecurity: Not on file   Transportation Needs: Not on file   Physical Activity: Not on file   Stress: Not on file   Social Connections: Not on file   Intimate Partner Violence: Not on file   Housing Stability: Not on file            No Known Allergies      ROS:  The patient was asked specifically about the following. General: Fever, chills, night sweats, unexplained weight loss or weight gain  Eyes: Blurry vision, double vision, floaters, loss or decrease of peripheral vision.    Ears: Difficulty hearing, ringing or buzzing in the ears, ear fullness, frequent ear infections, ear pain or drainage, hearing aid  Nose/Face: Drainage, frequent nosebleeds, sinus pain, pressure or fullness, difficulty breathing through the nose, facial pain, swelling or masses  Mouth: Sores in mouth, tongue soreness, bleeding gums, wears dentures, growths in mouth  Throat: Sore throat, hoarseness, difficulty swallowing, lump in throat, sore throat frequency  Respiratory: Difficulty breathing, frequent cough, productive cough, wheezing, recent abnormal chest X-RAY  Cardiovascular: Blocked arteries, chest pain, shortness of breath, abnormal heart beat, pacemaker  Digestive: Frequent indigestion, burning in throat,chest or stomach after a meal, burning that wakes you in the night, abdominal pain  Neuropsychiatric: Headaches, seizures, facial numbness or tingling, weakness, depressed mood or feeling sad, anxiety, inability to cope  Hematologic/Lymphatic: Anemia, easy bruising or bleeding, swollen glands, transfusions  Allergy/Immunologic: Hay fever, environmental allergies, food allergies, allergy testing, immunodeficiency  Endocrine: Heat or cold intolerance, fatigue, heart racing, profuse sweating, thyroid swelling, over or underactive thyroid, pituitary problems  Other: other problems not mentioned  All systems were negative with the exception of the following pertinent positives: Fatigue, postpartum depression     Vitals: There were no vitals filed for this visit. PHYSICAL EXAM: A comprehensive physical exam was performed in the following manner. Unless otherwise indicated in pertinent findings section below, findings were within normal limits. APPEARANCE:   General assessment for development status, nutritional status, and for pain or distress was performed. COMMUNICATION:   Ability to communicate effectively including vocal quality was assessed. HEAD AND FACE:   General exam of the face and scalp for any gross masses or lesions was performed. Palpation of the sinuses for any sign of pain or tenderness was performed. Facial nerve examination for any facial mimetic muscle asymmetry at rest and with effort was performed. Palpation of the submandibular and parotid glands was performed to assess for asymmetry, nodule or masses. EYES:   Extraocular motility was assessed for medial, lateral, superior and inferior rectus function as well as inferior and superior oblique function. The conjunctiva and eyelids were examined for injection, pallor or swelling. Pupil reactivity and accomodation was assessed. EARS:   External inspection and palpation of the auricular skin and cartilage was performed for lesion or abnormality. Otoscopy of the external auditory and tympanic membranes was performed to assess for patency, induration, erythema, tympanic membrane health and mobility and the presence of any middle ear fluid or abnormality. Speech reception thresholds were grossly assessed through communication at normal conversational levels. NOSE:   External exam for gross deformity of the nasal bones and upper and lower lateral cartilages was performed.    Anterior rhinoscopy was performed to assess the patency of the nasal airway, the anatomy of the nasal septum and turbinates as well as the nasal valve region, and the general mucosal health. The presence of any rhinorrhea and its consistency was noted. Any abnormalities requiring further evaluation by nasal endoscopy will be described below. MOUTH/PHARYNX/LARYNX:   Assessment of the lips, gums, hard/soft palate, tongue, tonsillar fossae and oropharynx for mass, lesions or mucosal abnormalities was performed. The base of tongue and floor of mouth were inspected for lesions and palpated for mass or nodularity. Mirror exam of the larynx to assess for vocal fold mobility and any gross mass or lesion was performed. Mirror exam of the nasopharynx was attempted, to assess for gross mass or lesion of the nasopharynx or any of adenoidal hyperplasia or inflammation. Any abnormalities requiring further examination by flexible endoscopy will be described below. NECK:   Gross inspection of the neck was performed to assess for mass or asymmetry. Palpation of the level I-IV lymph nodes was performed to assess for any grossly enlarged, or abnormally firm lymphadenopathy. The skin of the neck was examined for any induration or swelling and palpated for any crepitus. The larynx and trachea were palpated to assess position in the neck and continuity. The thyroid was palpated to assess for any mass, nodularity or asymmetry. NEURO/PSYCH:   Cranial nerves II-XII were grossly assessed for any weakness or asymmetry. If indicated, CN I was assessed by administration of a standardized smell test (UPSIT). Orientation to person, place and time was assessed. Mood and affect were assessed. RESPIRATION:   Respiratory effort was assessed for increased work of breathing and inspiratory or expiratory wheezing. Chest expansion was noted for symmetry.       CARDIOVASCULAR:   Gross examination for peripheral vascular edema and jugular venous distension was performed. PERTINENT PHYSICAL EXAM FINDINGS - examination for above was grossly within normal limits with exceptions listed below:  Fullness of left thyroid bed. No associated cervical lymphadenopathy appreciated. Demonstrates full vocal range including falsetto. Nasal dorsum significant for allergic salute. Mild bilateral inferior turbinate hypertrophy. Some cobblestoning of posterior pharyngeal mucosa noted. Studies Reviewed  Referral documentation  Thyroid US:  FINDINGS:   Right lobe:  5.9 x 1.5 x 1.2 cm. Homogeneous echotexture. No significant solid   or cystic nodule identified. Left lobe:  6.1 x 2.4 x 2.6 cm. Dominant middle lobe mass. Lesion 1:  2.7 x 2.5 cm mostly hypoechoic mass, irregular margins. Faint   echogenic areas may represent calcifications. FNA Path:   Available in media. Consistent with papillary thyroid carcinoma            ASSESSMENT AND PLAN:       Diagnosis Orders   1. Papillary thyroid carcinoma [facility])                Had extensive discussion with patient and her  today regarding risks benefits and alternatives to thyroidectomy for known papillary thyroid carcinoma. We discussed risks to include temporary or permanent vocal fold paralysis with need for vocal rehabilitation, temporary or permanent hypocalcemia as well as possible postoperative hematoma or seroma. Recommend total thyroidectomy with likely postoperative radioactive iodine ablation. Recommend overnight admission for pain control and monitoring calcium levels as well as drain output. Proceed with surgery as scheduled. The patient diagnoses and management plan were discussed at length. They  demonstrated an understanding of the plan and stated that all questions were answered to their satisfaction.          PATIENT EDUCATION / INSTRUCTIONS GIVEN FOR:  Total thyroidectomy

## 2023-02-20 NOTE — PERIOP NOTE
Dr. Earl Fabian at bedside; updates given. Order entered for ionized Ca level at 20:00 and q 8 hrs; order entered for Ceftin antibiotic PO q 12.

## 2023-02-20 NOTE — OP NOTE
Operative Note      Operative Note    Admit Service: ENT    Name: Severo Bear  MRN: [id number]  : 1995     Date:TODAYDATE@                 Pre-operative Diagnosis:  Papillary thyroid cancer    Post-op Diagnosis:  Papillary thyroid cancer    Procedure: Total Thryoidectomy    Surgeon:  Aamir Peck. Diana Tuttle MD     Assistant:  None    Anesthesia Type:  GEN    EBL:  50 mL    Specimen:  Left and right thyroid lobes plus isthmus    Drains:  Hemovac x 1    Findings  Superior and inferior parathyroids identified and preserved on the right. Nerve identified and preserved and confirm stimulation on 0.5mA. On the left two firm nodules palpated within the gland. More posterior nodule with dense scarring into the thryoid retaining ligament with tedious dissection with scar tissue over nerve, which was preserved. Superior parathyroid on the left involved by scar tissue and likely devascularized. Inferior parathyroid identified and preserved. Description of Procedure: Following discussion of the risks, benefits, indications, and alternatives  of the above-mentioned procedure, the patient was taken back to the  operating room and placed supine on the operating room table. General  anesthesia was induced by the Anesthesia Service and consent  was confirmed and time-out was performed. The neck was then prepped and draped in the usual sterile fashion. The NIM nerve monitor was confirmed to be functioning and used to confirm stimulation of the recurrent laryngeal nerve at the conclusion of the case. An incision in a low cervical rhytid was executed with the 15-blade to a sub-platysmal plane and superior and inferior flaps developed. The midline raphe was identified and the straps lateralized off of the isthmus and left thyroid lobe. The superior pole was bluntly dissected inferiorly and then retracted with the allis clamp. The superior pole vessels were ligated.  The parathyroid gland was identified and its blood supply preserved. The gland was then carefully dissected from its retaining ligament. The recurrent nerve was identified low during dissection and kept in view throughout the removal of the gland. The inferior parathyroid was also identified and its blood supply preserved. The gland and isthmus were then delivered and hemostasis assured. The wound bed was copiously lavaged with saline solution. Findings are noted above. In identical manner the left thyroid lobe was dissected free from the thyroid bed, with additional operative findings noted above. Hemostasis was assured and the wound bed irrigated. A drain was then placed through the skin inferiorly. The incision was closed in layer using 4-0 vicryl for the strap and platysmal muscles and 4-0 monocryl for the deep cutaneous skin. The superficial skin was closed with skin glue and steri strips. We confirmed that the drain was holding self suction. This concluded the operative portion of the procedure. The patient care was turned back to the Anesthesia Service, who allowed the patient to awaken without difficulty and she was transferred to recovery in stable fashion.      Disposition:  [facility]

## 2023-02-21 ENCOUNTER — TELEPHONE (OUTPATIENT)
Dept: ENT CLINIC | Age: 28
End: 2023-02-21

## 2023-02-21 VITALS
WEIGHT: 293 LBS | RESPIRATION RATE: 18 BRPM | SYSTOLIC BLOOD PRESSURE: 135 MMHG | DIASTOLIC BLOOD PRESSURE: 83 MMHG | OXYGEN SATURATION: 93 % | HEIGHT: 65 IN | BODY MASS INDEX: 48.82 KG/M2 | TEMPERATURE: 98.5 F | HEART RATE: 108 BPM

## 2023-02-21 LAB
Lab: NORMAL
Lab: NORMAL
REFERENCE LAB: NORMAL

## 2023-02-21 PROCEDURE — 82330 ASSAY OF CALCIUM: CPT

## 2023-02-21 PROCEDURE — G0378 HOSPITAL OBSERVATION PER HR: HCPCS

## 2023-02-21 PROCEDURE — 36415 COLL VENOUS BLD VENIPUNCTURE: CPT

## 2023-02-21 PROCEDURE — 6370000000 HC RX 637 (ALT 250 FOR IP): Performed by: OTOLARYNGOLOGY

## 2023-02-21 PROCEDURE — 2580000003 HC RX 258: Performed by: ANESTHESIOLOGY

## 2023-02-21 RX ORDER — OXYCODONE HYDROCHLORIDE AND ACETAMINOPHEN 5; 325 MG/1; MG/1
1 TABLET ORAL EVERY 6 HOURS PRN
Qty: 20 TABLET | Refills: 0 | Status: SHIPPED | OUTPATIENT
Start: 2023-02-21 | End: 2023-02-26

## 2023-02-21 RX ORDER — LEVOTHYROXINE SODIUM 0.15 MG/1
150 TABLET ORAL DAILY
Qty: 30 TABLET | Refills: 0 | Status: SHIPPED | OUTPATIENT
Start: 2023-02-21

## 2023-02-21 RX ORDER — CEFUROXIME AXETIL 500 MG/1
500 TABLET ORAL 2 TIMES DAILY
Qty: 14 TABLET | Refills: 0 | Status: SHIPPED | OUTPATIENT
Start: 2023-02-21 | End: 2023-02-28

## 2023-02-21 RX ADMIN — SODIUM CHLORIDE, POTASSIUM CHLORIDE, SODIUM LACTATE AND CALCIUM CHLORIDE: 600; 310; 30; 20 INJECTION, SOLUTION INTRAVENOUS at 12:29

## 2023-02-21 RX ADMIN — CALCIUM 1000 MG: 500 TABLET ORAL at 00:59

## 2023-02-21 RX ADMIN — CALCIUM 1000 MG: 500 TABLET ORAL at 08:48

## 2023-02-21 RX ADMIN — ONDANSETRON 4 MG: 4 TABLET, ORALLY DISINTEGRATING ORAL at 02:42

## 2023-02-21 RX ADMIN — LEVOTHYROXINE SODIUM 150 MCG: 0.07 TABLET ORAL at 08:48

## 2023-02-21 RX ADMIN — CEFUROXIME AXETIL 500 MG: 250 TABLET ORAL at 08:48

## 2023-02-21 RX ADMIN — OXYCODONE HYDROCHLORIDE 10 MG: 5 TABLET ORAL at 16:09

## 2023-02-21 RX ADMIN — CEFUROXIME AXETIL 500 MG: 250 TABLET ORAL at 00:59

## 2023-02-21 RX ADMIN — CALCITRIOL 0.25 MCG: 0.25 CAPSULE ORAL at 08:48

## 2023-02-21 NOTE — PERIOP NOTE
Dr. Zayas Feeling at bedside; updates given. Giving compazine for nausea; total dilaudid given OR and pacu = 3 mg. Will give 10 mg oxy before patient goes upstairs. MD agrees with plan. OK upstairs at appropriate time.

## 2023-02-21 NOTE — TELEPHONE ENCOUNTER
Left message for patient to return call. Attempted to reach following surgery to ensure she received medication and see if there were any post operative questions.

## 2023-02-21 NOTE — ANESTHESIA POSTPROCEDURE EVALUATION
Department of Anesthesiology  Postprocedure Note    Patient: Karen Plummer  MRN: [id number]  YOB: 1995  Date of evaluation: 2/20/2023      Procedure Summary     Date: 02/20/23 Room / Location: [facility] MAIN OR 04 / [facility] MAIN OR    Anesthesia Start: 1355 Anesthesia Stop: 7701    Procedure: THYROIDECTOMY metronic nims (nerve monitoring) with laryngeal nerve monitoring (Neck) Diagnosis:       Thyroid cancer ([address]      (Thyroid cancer ([address] Brooke Handing)    Surgeons: Benjamin Feliciano MD Responsible Provider: Renny Carlson MD    Anesthesia Type: General ASA Status: 4          Anesthesia Type: General    Mima Phase I: Mima Score: 8    Mima Phase II:        Anesthesia Post Evaluation    Patient location during evaluation: PACU  Patient participation: complete - patient participated  Level of consciousness: awake and alert  Airway patency: patent  Nausea & Vomiting: nausea (improved)  Complications: no  Cardiovascular status: hemodynamically stable  Respiratory status: acceptable, nonlabored ventilation and spontaneous ventilation  Hydration status: euvolemic  Comments: BP (!) 112/56   Pulse 96   Temp 97.7 °F (36.5 °C) (Temporal)   Resp 16   Ht 5' 5\" (1.651 m)   Wt (!) 331 lb 9.6 oz (150.4 kg)   LMP 02/12/2023   SpO2 95%   BMI 55.18 kg/m²     Multimodal analgesia pain management approach

## 2023-02-21 NOTE — DISCHARGE SUMMARY
Discharge Summary    Date: 2/21/2023  Patient Name: Alee Bar    YOB: 1995     Age: 33 y.o. Admit Date: 2/20/2023  Discharge Date:  Discharge Condition: Good    Admission Diagnosis  Thyroid cancer ([facility] [address] Jeff Fontaine; Cancer of thyroid [facility]) Jeff Fontaine      Discharge Diagnosis  Principal Problem:    Papillary thyroid carcinoma ([facility] [address]  Active Problems:    Cancer of thyroid ([facility] [address]  Resolved Problems:    * No resolved hospital problems. [facility] AND [facility] Stay  Narrative of Hospital Course:  Uneventful recovery from total thyroidectomy. Drain removed after 24hr monitoring output. Consultants:  None    Surgeries/procedures Performed: Total thyroidectomy     Treatments:    Surgery        Discharge Plan/Disposition:  Home    Hospital/Incidental Findings Requiring Follow Up:    Patient Instructions:    Diet:    Activity:No Heavy Lifting and No Lifting, Driving or Strenuous Excercise  For number of days (if applicable): 1      Other Instructions: No driving while on pain medication. Otherwise ok to drive. Ok to shower today and get incision site wet (do not submerge). RTC one week for incision check. Provider Follow-Up:   No follow-ups on file.      Significant Diagnostic Studies:    Recent Labs:  Admission on 02/20/2023  Crossmatch expiration date                    Date: 02/20/2023  Value: 02/23/2023,2359                       Status: Final  ABO/Rh                                        Date: 02/20/2023  Value: O POSITIVE    Status: Final  Antibody Screen                               Date: 02/20/2023  Value: NEG           Status: Final  Calcium                                       Date: 02/20/2023  Value: 8.4         Ref range: 8.3 - 10.4 MG/DL   Status: Final  Pth Intact                                    Date: 02/20/2023  Value: 80.1        Ref range: 18.5 - 88.0 pg/mL  Status: Final                Comment: ** Note new reference range and method **  (NOTE)  Calcium within the reference range and intact PTH below the  analytical measurement range suggestive of primary hypoparathyroidism. Elevated calcium and intact PTH below the analytical measurement range  suggestive of hypercalcemia of malignancy. Elevated calcium and intact PTH greater than the midpoint of the  reference range suggestive of primary hyperparathyroidism. Test Description:                             Date: 02/21/2023  Value: IONIZED CA    Status: Final  Reference Lab                                 Date: 02/21/2023  Value: PERFORMED BY [facility]                     Ref range:                    Status: Final  Results:                                      Date: 02/21/2023  Value: PENDING       Status: Incomplete  ------------    Radiology last 7 days:  No results found. Pending Labs     Order Current Status    Calcium, Ionized In process    Miscellaneous Sendout Preliminary result        Discharge Medications    Current Discharge Medication List    START taking these medications    cefUROXime (CEFTIN) 500 MG tablet  Take 1 tablet by mouth 2 times daily for 7 days  Qty: 14 tablet Refills: 0    oxyCODONE-acetaminophen (PERCOCET) 5-325 MG per tablet  Take 1 tablet by mouth every 6 hours as needed for Pain for up to 5 days. Intended supply: 5 days. Take lowest dose possible to manage pain Max Daily Amount: 4 tablets  Qty: 20 tablet Refills: 0  Comments: Reduce doses taken as pain becomes manageable  Associated Diagnoses:Post-op pain    levothyroxine (SYNTHROID) 150 MCG tablet  Take 1 tablet by mouth daily  Qty: 30 tablet Refills: 0          Current Discharge Medication List        Current Discharge Medication List    CONTINUE these medications which have NOT CHANGED    phentermine (ADIPEX-P) 37.5 MG tablet  Take 1 tablet by mouth every morning (before breakfast) for 30 days.  Max Daily Amount: 37.5 mg  Qty: 30 tablet Refills: 0  Associated Diagnoses:Obesity, morbid (HCC)    ibuprofen (ADVIL;MOTRIN) 200 MG tablet  Take 200 mg by mouth every 6 hours as needed for Pain          Current Discharge Medication List        Time Spent on Discharge:  20 minutes were spent in patient examination, evaluation, counseling as well as medication reconciliation, prescriptions for required medications, discharge plan, and follow up.     Electronically signed by Sj Cintron MD on 2/21/23 at 4:44 PM EST

## 2023-02-21 NOTE — PROGRESS NOTES
Discharge instructions given. Education provided. All questions answered and verbally voiced understanding. Medication changes and follow up appointments discussed. AVS reviewed, signed, and placed in chart. Copy provided for pt. Pt   at bedside to transport pt home.

## 2023-02-21 NOTE — CARE COORDINATION
Patient here for a thyroidectomy, independent, insured, and established with primary care with good family support. No needs anticipated by SW at this time. ASSESSMENT NOTE    Attending Physician: Pranav Robin MD  Admit Problem: Thyroid cancer [facility]) [C73]  Cancer of thyroid [facility]) [C73]  Date/Time of Admission: 2/20/2023 10:39 AM  Problem List:  Patient Active Problem List   Diagnosis    Obesity, morbid ([address]    IIH (idiopathic intracranial hypertension)    Pseudotumor cerebri syndrome    Pseudotumor cerebri    Headache around the eyes    Thyroid nodule    Family history of thyroid cancer    Papillary thyroid carcinoma (HCC)    Cancer of [facility])       Service Assessment  Patient Orientation Alert and Oriented, Situation, Self, Place, Person   Cognition Alert   History Provided By Patient   Primary Caregiver Self   Accompanied By/Relationship     Support Systems Spouse/Significant Other, Family Members   Patient's Healthcare Decision Maker is:     PCP Verified by CM Yes   Last Visit to PCP Within last 3 months   Prior Functional Level Independent in ADLs/IADLs   Current Functional Level Independent in ADLs/IADLs   Can patient return to prior living arrangement Yes   Ability to make needs known: Good   Family able to assist with home care needs: Yes   Would you like for me to discuss the discharge plan with any other family members/significant others, and if so, who?      Financial Resources Medicaid, Medicare   Community Resources None   CM/SW Referral       Social/Functional History  Lives With     Type of [address] Access     Entrance Stairs - Number of Steps     Entrance Stairs - Rails     Bathroom Shower/Tub     Bathroom Toilet     Bathroom Equipment     Bathroom Accessibility     Home Equipment     Receives Help From     ADL Assistance     Bath     Dressing     Grooming     Feeding     Toileting     [facility] Assistance     Meal Prep     Laundry     Vacuuming     Cleaning [address]     Other (Comment)     Homemaking Responsibilities     Meal Prep Responsibility     Laundry Responsibility     Cleaning Responsibility     Bill Paying/Finance [facility] Management     Other (Comment)     Ambulation Assistance     Transfer Assistance     Active      Patient's  Info     Mode of Transportation     Education     Occupation     Type of Occupation       Discharge Planning   Type of [address] [facility] Spouse/Significant Other, Family Members   Current Services Prior To Admission     Potential Assistance Needed     DME     DME     DME Ordered? Potential Assistance Purchasing Medications     Meds-to-Beds: Does the patient want to have any new prescriptions delivered to bedside prior to discharge? Type of Home Care Services     Patient expects to be discharged to: House   Follow Up Appointment: Best Day/Time     One/Two Story Residence:     # of Interior Steps     Height of Each Step (in)     [employer] Available     History of Falls? Services At/After Discharge  Transition of Care Consult (CM Consult): Internal Home Health     Internal Hospice     Reason Outside Agency [address]     Partner SNF     Reason Why Partner SNF Not Chosen     Internal Comfort Care     Reason Outside [address] Discharge     [address] Provided? Mode of Transport at [facility] Time of Discharge     Confirm Follow Up Transport       Condition of Participation: Discharge Planning  The plan for Transition of Care is related to the following treatment goals: The Patient and/or Patient Representative was provided with a Choice of Provider?      Name of the Patient Representative who was provided with the Choice of Provider and agrees with the Discharge Plan? The Patient and/or Patient Representative Agree with the Discharge Plan? Freedom of Choice list was provided with basic dialogue that supports the individualized plan of care/goals, treatment preferences, and shares the quality data associated with the providers?        Documentation for Discharge Appeal  Discharge Appealed by     Date notified by [facility] of appeal request:     Time notified by [facility]IO of appeal request:     Detailed Notice of Discharge given to:     Date Notice of Discharge given:     Time Notice of Discharge given:     Date records sent to QIO     Time records sent to Lili Cornelius     Date Notified of Outcome     Time Notified of Outcome     Outcome of appeal           ROXANNA Santa 02/21/23 9:19 AM      [address] [facility], [address] [place] [address] Work   [address] [facility]

## 2023-02-21 NOTE — PERIOP NOTE
TRANSFER - OUT REPORT:    Verbal report given to Gisselle Ramirez RN on Gemma Leyva  being transferred to Room 356 for routine progression of patient care       Report consisted of patient's Situation, Background, Assessment and   Recommendations(SBAR). Information from the following report(s) Nurse Handoff Report, Surgery Report, Intake/Output, MAR, Recent Results, and Cardiac Rhythm ST  was reviewed with the receiving nurse. [place] Assessment: No data recorded  Lines:   Peripheral IV 02/20/23 Left;Posterior Hand (Active)   Site Assessment Clean, dry & intact 02/20/23 1810   Line Status Infusing 02/20/23 [address] Connections checked and tightened 02/20/23 1810   Phlebitis Assessment No symptoms 02/20/23 1810   Infiltration Assessment 0 02/20/23 1810   Alcohol Cap Used No 02/20/23 1810   Dressing Status Clean, dry & intact 02/20/23 1810   Dressing Type Transparent 02/20/23 1810        Opportunity for questions and clarification was provided. Patient transported with:  O2 @ 3lpm; PTH lab is pending; ionized calcium should be drawn at 20:00 and every 8 hours after. Dr. Shauna Perez should be notified of any concerning lab results.

## 2023-02-21 NOTE — PERIOP NOTE
I called Dr. Monica Lopes and informed him that the parathyroid lab is a send-out and result will not be available tonight. No new orders.

## 2023-02-27 ENCOUNTER — OFFICE VISIT (OUTPATIENT)
Dept: ENT CLINIC | Age: 28
End: 2023-02-27

## 2023-02-27 VITALS — WEIGHT: 293 LBS | HEIGHT: 65 IN | BODY MASS INDEX: 48.82 KG/M2

## 2023-02-27 DIAGNOSIS — Z09 SURGERY FOLLOW-UP: Primary | ICD-10-CM

## 2023-02-27 NOTE — PROGRESS NOTES
02/27/23    Chief Complaint   Patient presents with    Post-Op Check     Total thyroidectomy        HPI:  Patient status post total thyroidectomy. Denies any significant postoperative complications. Feels her energy levels have returned. Outpatient Encounter Medications as of 2/27/2023   Medication Sig Dispense Refill    cefUROXime (CEFTIN) 500 MG tablet Take 1 tablet by mouth 2 times daily for 7 days 14 tablet 0    levothyroxine (SYNTHROID) 150 MCG tablet Take 1 tablet by mouth daily 30 tablet 0     No facility-administered encounter medications on file as of 2/27/2023.        Past Medical History:   Diagnosis Date    PIH (pregnancy induced hypertension)     Pseudotumor     Thyroid cancer ([facility] [address]     STAGE 2 - SURGERY 2/20       Past Surgical History:   Procedure Laterality Date    OTHER SURGICAL HISTORY Left     toe surgery    SHUNT REVISION Right 2016    THYROIDECTOMY N/A 2/20/2023    THYROIDECTOMY metronic nims (nerve monitoring) with laryngeal nerve monitoring performed by Perla Saha MD at [address] [place] [address]         Family History   Problem Relation Age of Onset    No Known Problems Mother     No Known Problems Father     Thyroid Cancer Brother     Thyroid Cancer Maternal Grandmother     Diabetes Paternal Grandmother     Diabetes Paternal Grandfather     Cancer Paternal Grandfather        Social History     Socioeconomic History    Marital status: Single     Spouse name: None    Number of children: None    Years of education: None    Highest education level: None   Tobacco Use    Smoking status: Never    Smokeless tobacco: Never   Vaping Use    Vaping Use: Never used   Substance and Sexual Activity    Alcohol use: No    Drug use: Not Currently       No Known Allergies      PHYSICAL EXAM:    Vitals:   Vitals:    02/27/23 1612   Weight: (!) 331 lb (150.1 kg)   Height: 5' 5\" (1.651 m)            NECK/THYROID:  Steri-Strips removed, incision clean dry intact and flat  Demonstrates full vocal range      ASSESSMENT AND PLAN:       ICD-10-CM    1. Surgery follow-up  Z09            Status post total thyroidectomy for papillary thyroid carcinoma with good result. Currently on Synthroid 150 mcg daily. Postoperative parathyroid hormone level of 80 within the high normal range. Ionized calcium postoperatively within normal limits. Follow-up with endocrinology to discuss Thyrogen stimulated radioactive iodine therapy. Incision check in 1 month. The patient diagnoses and management plan were discussed at length. They  demonstrated and understanding of the plan and stated that all questions were answered to their satisfaction. PATIENT EDUCATION / INSTRUCTIONS GIVEN FOR:  Anticipatory guidance for continued cancer care.

## 2023-03-31 ENCOUNTER — OFFICE VISIT (OUTPATIENT)
Dept: ENT CLINIC | Age: 28
End: 2023-03-31

## 2023-03-31 VITALS — HEIGHT: 65 IN | BODY MASS INDEX: 48.82 KG/M2 | RESPIRATION RATE: 16 BRPM | WEIGHT: 293 LBS

## 2023-03-31 DIAGNOSIS — C73 PAPILLARY THYROID CARCINOMA (HCC): Primary | ICD-10-CM

## 2023-03-31 DIAGNOSIS — E03.9 HYPOTHYROIDISM (ACQUIRED): ICD-10-CM

## 2023-03-31 RX ORDER — LEVOTHYROXINE SODIUM 0.15 MG/1
150 TABLET ORAL DAILY
Qty: 10 TABLET | Refills: 0 | Status: SHIPPED | OUTPATIENT
Start: 2023-03-31 | End: 2023-04-10

## 2023-03-31 NOTE — PROGRESS NOTES
abnormalities requiring further examination by flexible endoscopy will be described below. RESPIRATION:   Respiratory effort was assessed for increased work of breathing and inspiratory or expiratory wheezing. Chest expansion was noted for symmetry. CARDIOVASCULAR:   Gross examination for peripheral vascular edema and jugular venous distension was performed. PERTINENT PHYSICAL EXAM FINDINGS   Incision clean dry intact and flat in natural cervical righted in the lower anterior neck. Demonstrates full vocal range        ASSESSMENT AND PLAN:      Diagnosis Orders   1. Papillary thyroid carcinoma ([address]        2. Hypothyroidism (acquired)  TSH with Reflex    T4, Free    T3, Free              Patient is status post total thyroidectomy for papillary thyroid carcinoma. Recommend approximate follow-up with endocrinology for discussion of Thyrogen stimulated radioactive iodine uptake therapy. We will continue to titrate her Synthroid. Labs ordered at today's visit. Clinical follow-up in 3 to 4 months. The patient diagnoses and management plan were discussed at length. They  demonstrated and understanding of the plan and stated that all questions were answered to their satisfaction.      PATIENT EDUCATION / INSTRUCTIONS GIVEN FOR:   Incision care*

## 2023-04-03 LAB
T3FREE SERPL-MCNC: 2.2 PG/ML (ref 2–4.4)
T4 FREE SERPL-MCNC: 0.6 NG/DL (ref 0.78–1.46)
TSH W FREE THYROID IF ABNORMAL: 52.8 UIU/ML (ref 0.36–3.74)

## 2023-04-03 RX ORDER — LEVOTHYROXINE SODIUM 0.2 MG/1
200 TABLET ORAL DAILY
Qty: 30 TABLET | Refills: 5 | Status: SHIPPED | OUTPATIENT
Start: 2023-04-03

## 2023-05-02 ENCOUNTER — OFFICE VISIT (OUTPATIENT)
Dept: ENDOCRINOLOGY | Age: 28
End: 2023-05-02
Payer: MEDICARE

## 2023-05-02 VITALS
BODY MASS INDEX: 55.58 KG/M2 | SYSTOLIC BLOOD PRESSURE: 128 MMHG | OXYGEN SATURATION: 96 % | DIASTOLIC BLOOD PRESSURE: 88 MMHG | HEART RATE: 98 BPM | WEIGHT: 293 LBS

## 2023-05-02 DIAGNOSIS — E89.0 POSTSURGICAL HYPOTHYROIDISM: ICD-10-CM

## 2023-05-02 DIAGNOSIS — C73 PAPILLARY THYROID CARCINOMA (HCC): Primary | ICD-10-CM

## 2023-05-02 PROBLEM — E04.1 THYROID NODULE: Status: RESOLVED | Noted: 2022-11-17 | Resolved: 2023-05-02

## 2023-05-02 PROCEDURE — 99214 OFFICE O/P EST MOD 30 MIN: CPT | Performed by: INTERNAL MEDICINE

## 2023-05-02 PROCEDURE — G8427 DOCREV CUR MEDS BY ELIG CLIN: HCPCS | Performed by: INTERNAL MEDICINE

## 2023-05-02 PROCEDURE — G8417 CALC BMI ABV UP PARAM F/U: HCPCS | Performed by: INTERNAL MEDICINE

## 2023-05-02 PROCEDURE — 1036F TOBACCO NON-USER: CPT | Performed by: INTERNAL MEDICINE

## 2023-05-02 RX ORDER — LEVOTHYROXINE SODIUM 0.2 MG/1
200 TABLET ORAL DAILY
Qty: 30 TABLET | Refills: 5 | Status: SHIPPED | OUTPATIENT
Start: 2023-05-02

## 2023-05-02 ASSESSMENT — ENCOUNTER SYMPTOMS
CONSTIPATION: 0
DIARRHEA: 1

## 2023-05-02 NOTE — PROGRESS NOTES
Signs: /88   Pulse 98   Wt (!) 334 lb (151.5 kg)   SpO2 96%   BMI 55.58 kg/m²     Wt Readings from Last 3 Encounters:   05/02/23 (!) 334 lb (151.5 kg)   03/31/23 (!) 331 lb (150.1 kg)   02/27/23 (!) 331 lb (150.1 kg)       Physical Exam  Constitutional:       General: She is not in acute distress. Neck:      Comments: Thyroidectomy scar. Cardiovascular:      Rate and Rhythm: Normal rate and regular rhythm. Lymphadenopathy:      Cervical: No cervical adenopathy. Neurological:      Motor: No tremor. Orders Placed This Encounter   Procedures    TSH with Reflex     Standing Status:   Future     Standing Expiration Date:   5/2/2024    Thyroglobulin Ab and Thyroglobulin, NEGIRTO or DOMINIQUE     Standing Status:   Future     Standing Expiration Date:   5/2/2024    TSH with Reflex     Standing Status:   Future     Standing Expiration Date:   5/2/2024    Thyroglobulin Ab and Thyroglobulin, NEGRITO or DOMINIQUE     Standing Status:   Future     Standing Expiration Date:   5/2/2024         Current Outpatient Medications   Medication Sig Dispense Refill    levothyroxine (SYNTHROID) 200 MCG tablet Take 1 tablet by mouth daily 30 tablet 5     No current facility-administered medications for this visit.

## 2023-09-26 ENCOUNTER — OFFICE VISIT (OUTPATIENT)
Dept: FAMILY MEDICINE CLINIC | Facility: CLINIC | Age: 28
End: 2023-09-26
Payer: MEDICARE

## 2023-09-26 VITALS
SYSTOLIC BLOOD PRESSURE: 128 MMHG | WEIGHT: 293 LBS | OXYGEN SATURATION: 97 % | TEMPERATURE: 98 F | BODY MASS INDEX: 48.82 KG/M2 | HEART RATE: 66 BPM | RESPIRATION RATE: 18 BRPM | HEIGHT: 65 IN | DIASTOLIC BLOOD PRESSURE: 84 MMHG

## 2023-09-26 DIAGNOSIS — D58.2 ELEVATED HEMOGLOBIN (HCC): ICD-10-CM

## 2023-09-26 DIAGNOSIS — R40.0 DAYTIME SOMNOLENCE: ICD-10-CM

## 2023-09-26 DIAGNOSIS — C73 PAPILLARY THYROID CARCINOMA (HCC): ICD-10-CM

## 2023-09-26 DIAGNOSIS — R73.09 ELEVATED GLUCOSE: ICD-10-CM

## 2023-09-26 DIAGNOSIS — R06.83 LOUD SNORING: ICD-10-CM

## 2023-09-26 DIAGNOSIS — E66.01 OBESITY, MORBID (HCC): Primary | ICD-10-CM

## 2023-09-26 LAB
ALBUMIN SERPL-MCNC: 4.1 G/DL (ref 3.5–5)
ALBUMIN/GLOB SERPL: 1.2 (ref 0.4–1.6)
ALP SERPL-CCNC: 93 U/L (ref 50–136)
ALT SERPL-CCNC: 44 U/L (ref 12–65)
ANION GAP SERPL CALC-SCNC: 9 MMOL/L (ref 2–11)
AST SERPL-CCNC: 26 U/L (ref 15–37)
BASOPHILS # BLD: 0.1 K/UL (ref 0–0.2)
BASOPHILS NFR BLD: 1 % (ref 0–2)
BILIRUB SERPL-MCNC: 0.4 MG/DL (ref 0.2–1.1)
BUN SERPL-MCNC: 13 MG/DL (ref 6–23)
CALCIUM SERPL-MCNC: 9.2 MG/DL (ref 8.3–10.4)
CHLORIDE SERPL-SCNC: 110 MMOL/L (ref 101–110)
CO2 SERPL-SCNC: 24 MMOL/L (ref 21–32)
CREAT SERPL-MCNC: 1.1 MG/DL (ref 0.6–1)
DIFFERENTIAL METHOD BLD: NORMAL
EOSINOPHIL # BLD: 0.1 K/UL (ref 0–0.8)
EOSINOPHIL NFR BLD: 2 % (ref 0.5–7.8)
ERYTHROCYTE [DISTWIDTH] IN BLOOD BY AUTOMATED COUNT: 13.7 % (ref 11.9–14.6)
GLOBULIN SER CALC-MCNC: 3.4 G/DL (ref 2.8–4.5)
GLUCOSE SERPL-MCNC: 111 MG/DL (ref 65–100)
HCT VFR BLD AUTO: 45.8 % (ref 35.8–46.3)
HGB BLD-MCNC: 15.1 G/DL (ref 11.7–15.4)
IMM GRANULOCYTES # BLD AUTO: 0.1 K/UL (ref 0–0.5)
IMM GRANULOCYTES NFR BLD AUTO: 1 % (ref 0–5)
LYMPHOCYTES # BLD: 2.5 K/UL (ref 0.5–4.6)
LYMPHOCYTES NFR BLD: 26 % (ref 13–44)
MCH RBC QN AUTO: 29 PG (ref 26.1–32.9)
MCHC RBC AUTO-ENTMCNC: 33 G/DL (ref 31.4–35)
MCV RBC AUTO: 88.1 FL (ref 82–102)
MONOCYTES # BLD: 0.5 K/UL (ref 0.1–1.3)
MONOCYTES NFR BLD: 5 % (ref 4–12)
NEUTS SEG # BLD: 6.3 K/UL (ref 1.7–8.2)
NEUTS SEG NFR BLD: 65 % (ref 43–78)
NRBC # BLD: 0 K/UL (ref 0–0.2)
PLATELET # BLD AUTO: 311 K/UL (ref 150–450)
PMV BLD AUTO: 9.4 FL (ref 9.4–12.3)
POTASSIUM SERPL-SCNC: 3.8 MMOL/L (ref 3.5–5.1)
PROT SERPL-MCNC: 7.5 G/DL (ref 6.3–8.2)
RBC # BLD AUTO: 5.2 M/UL (ref 4.05–5.2)
SODIUM SERPL-SCNC: 143 MMOL/L (ref 133–143)
WBC # BLD AUTO: 9.5 K/UL (ref 4.3–11.1)

## 2023-09-26 PROCEDURE — 1036F TOBACCO NON-USER: CPT | Performed by: FAMILY MEDICINE

## 2023-09-26 PROCEDURE — G8427 DOCREV CUR MEDS BY ELIG CLIN: HCPCS | Performed by: FAMILY MEDICINE

## 2023-09-26 PROCEDURE — 99214 OFFICE O/P EST MOD 30 MIN: CPT | Performed by: FAMILY MEDICINE

## 2023-09-26 PROCEDURE — G8417 CALC BMI ABV UP PARAM F/U: HCPCS | Performed by: FAMILY MEDICINE

## 2023-09-26 ASSESSMENT — PATIENT HEALTH QUESTIONNAIRE - PHQ9
1. LITTLE INTEREST OR PLEASURE IN DOING THINGS: 0
SUM OF ALL RESPONSES TO PHQ QUESTIONS 1-9: 0
2. FEELING DOWN, DEPRESSED OR HOPELESS: 0
SUM OF ALL RESPONSES TO PHQ QUESTIONS 1-9: 0
SUM OF ALL RESPONSES TO PHQ9 QUESTIONS 1 & 2: 0

## 2023-09-26 ASSESSMENT — ENCOUNTER SYMPTOMS
NAUSEA: 0
DIARRHEA: 0
COUGH: 0
VOMITING: 0
SHORTNESS OF BREATH: 0

## 2023-09-26 NOTE — PROGRESS NOTES
Joy Stern (:  1995) is a 33 y.o. female,Established patient, here for evaluation of the following chief complaint(s): Other (Wanting referral for weight loss surgery. )         ASSESSMENT/PLAN:  1. Obesity, morbid ([address])  Assessment & Plan:   Uncontrolled, lifestyle modifications recommended, would like bariatric surgery referral  Orders:  -     [facility]  -     [address] James Ramirez MD, Bariatric Surgery, [facility], [place]  2. Papillary thyroid carcinoma ([address])  Assessment & Plan:   Borderline controlled, continue current medications  FU with Endo as scheduled  3. Loud snoring-per , and tired all the time  -     [facility]  4. Daytime somnolence- set up sleep study  -     [facility]  5. Elevated hemoglobin ([address])- possibly due to sleep apnea- repeat cbc, check sleep study  -     CBC with Auto Differential; Future  6. Elevated glucose- was elevated at ER in August- check cmp and A1C  -     Comprehensive Metabolic Panel; Future  -     Hemoglobin A1C; Future      Return if symptoms worsen or fail to improve. Subjective   SUBJECTIVE/OBJECTIVE:  Other  This is a chronic problem. The current episode started more than 1 year ago. The problem occurs constantly. The problem has been gradually worsening. Pertinent negatives include no chest pain, chills, coughing, fatigue, nausea or vomiting. Sleep Problem  This is a chronic problem. The current episode started more than 1 year ago. The problem occurs daily. The problem has been gradually worsening. Pertinent negatives include no chest pain, chills, coughing, fatigue, nausea or vomiting. Review of Systems   Constitutional:  Negative for chills and fatigue. Respiratory:  Negative for cough and shortness of breath. Cardiovascular:  Negative for chest pain and leg swelling. Gastrointestinal:  Negative for diarrhea, nausea and vomiting.

## 2023-09-27 DIAGNOSIS — R79.89 ELEVATED SERUM CREATININE: Primary | ICD-10-CM

## 2023-09-27 LAB
EST. AVERAGE GLUCOSE BLD GHB EST-MCNC: 108 MG/DL
HBA1C MFR BLD: 5.4 % (ref 4.8–5.6)

## 2024-01-11 NOTE — PROGRESS NOTES
weight gain during prep period.  5.  Upper GI ordered  6.  Physical Therapy Evaluation  7.  Dr. Ndiaye Clearance -  [place]      She will return after the above are complete for assessment of her progress and schedule surgery.      Time: I spent 60 minutes preparing to see patient (including chart review and preparation), obtaining and/or reviewing additional medical history, performing a physical exam and evaluation, documenting clinical information in the electronic health record, independently interpreting results, communicating results to patient, family or caregiver, and/or coordinating care.        Signed: Karen Soria MD  Bariatric & Minimally Invasive Surgery  1/16/2024

## 2024-01-16 ENCOUNTER — OFFICE VISIT (OUTPATIENT)
Dept: SURGERY | Age: 29
End: 2024-01-16
Payer: MEDICARE

## 2024-01-16 VITALS
WEIGHT: 293 LBS | SYSTOLIC BLOOD PRESSURE: 153 MMHG | HEIGHT: 65 IN | HEART RATE: 85 BPM | DIASTOLIC BLOOD PRESSURE: 77 MMHG | BODY MASS INDEX: 48.82 KG/M2

## 2024-01-16 DIAGNOSIS — G93.2 IIH (IDIOPATHIC INTRACRANIAL HYPERTENSION): ICD-10-CM

## 2024-01-16 DIAGNOSIS — Z87.19 HX OF GASTROESOPHAGEAL REFLUX (GERD): ICD-10-CM

## 2024-01-16 DIAGNOSIS — E89.0 POSTSURGICAL HYPOTHYROIDISM: ICD-10-CM

## 2024-01-16 DIAGNOSIS — Z71.89 ENCOUNTER FOR PRE-BARIATRIC SURGERY COUNSELING AND EDUCATION: ICD-10-CM

## 2024-01-16 DIAGNOSIS — R63.5 ABNORMAL WEIGHT GAIN: ICD-10-CM

## 2024-01-16 DIAGNOSIS — Z01.812 ENCOUNTER FOR PRE-OPERATIVE LABORATORY TESTING: ICD-10-CM

## 2024-01-16 DIAGNOSIS — E66.01 OBESITY, MORBID, BMI 50 OR HIGHER (HCC): ICD-10-CM

## 2024-01-16 DIAGNOSIS — Z13.21 ENCOUNTER FOR VITAMIN DEFICIENCY SCREENING: ICD-10-CM

## 2024-01-16 DIAGNOSIS — R79.9 ABNORMAL FINDING OF BLOOD CHEMISTRY, UNSPECIFIED: ICD-10-CM

## 2024-01-16 DIAGNOSIS — E66.01 MORBID OBESITY (HCC): Primary | ICD-10-CM

## 2024-01-16 PROBLEM — O14.93 PREECLAMPSIA, THIRD TRIMESTER: Status: ACTIVE | Noted: 2019-01-02

## 2024-01-16 PROCEDURE — G8484 FLU IMMUNIZE NO ADMIN: HCPCS | Performed by: PHYSICIAN ASSISTANT

## 2024-01-16 PROCEDURE — APPSS45 APP SPLIT SHARED TIME 31-45 MINUTES: Performed by: PHYSICIAN ASSISTANT

## 2024-01-16 PROCEDURE — 1036F TOBACCO NON-USER: CPT | Performed by: SURGERY

## 2024-01-16 PROCEDURE — G8417 CALC BMI ABV UP PARAM F/U: HCPCS | Performed by: PHYSICIAN ASSISTANT

## 2024-01-16 PROCEDURE — 99205 OFFICE O/P NEW HI 60 MIN: CPT | Performed by: SURGERY

## 2024-01-16 PROCEDURE — G8427 DOCREV CUR MEDS BY ELIG CLIN: HCPCS | Performed by: PHYSICIAN ASSISTANT

## 2024-01-16 RX ORDER — MEDROXYPROGESTERONE ACETATE 10 MG/1
10 TABLET ORAL DAILY
COMMUNITY
Start: 2023-12-27

## 2024-02-13 DIAGNOSIS — E66.01 MORBID OBESITY (HCC): ICD-10-CM

## 2024-02-13 DIAGNOSIS — Z01.812 ENCOUNTER FOR PRE-OPERATIVE LABORATORY TESTING: ICD-10-CM

## 2024-02-13 DIAGNOSIS — Z71.89 ENCOUNTER FOR PRE-BARIATRIC SURGERY COUNSELING AND EDUCATION: ICD-10-CM

## 2024-02-13 DIAGNOSIS — R63.5 ABNORMAL WEIGHT GAIN: ICD-10-CM

## 2024-02-13 DIAGNOSIS — Z13.21 ENCOUNTER FOR VITAMIN DEFICIENCY SCREENING: ICD-10-CM

## 2024-02-13 DIAGNOSIS — R79.9 ABNORMAL FINDING OF BLOOD CHEMISTRY, UNSPECIFIED: ICD-10-CM

## 2024-02-14 ENCOUNTER — OFFICE VISIT (OUTPATIENT)
Dept: BEHAVIORAL/MENTAL HEALTH CLINIC | Facility: CLINIC | Age: 29
End: 2024-02-14
Payer: MEDICARE

## 2024-02-14 ENCOUNTER — TELEPHONE (OUTPATIENT)
Dept: SURGERY | Age: 29
End: 2024-02-14

## 2024-02-14 DIAGNOSIS — Z87.59 HISTORY OF POSTPARTUM DEPRESSION: ICD-10-CM

## 2024-02-14 DIAGNOSIS — Z62.819 HISTORY OF ABUSE IN CHILDHOOD: ICD-10-CM

## 2024-02-14 DIAGNOSIS — Z86.59 HISTORY OF POSTPARTUM DEPRESSION: ICD-10-CM

## 2024-02-14 DIAGNOSIS — E66.01 MORBID OBESITY WITH BMI OF 50.0-59.9, ADULT (HCC): Primary | ICD-10-CM

## 2024-02-14 LAB
25(OH)D3 SERPL-MCNC: 11.2 NG/ML (ref 30–100)
EST. AVERAGE GLUCOSE BLD GHB EST-MCNC: 105 MG/DL
FERRITIN SERPL-MCNC: 28 NG/ML (ref 8–388)
FOLATE SERPL-MCNC: 8.1 NG/ML (ref 3.1–17.5)
HBA1C MFR BLD: 5.3 % (ref 4.8–5.6)
IRON SERPL-MCNC: 60 UG/DL (ref 35–150)
TSH, 3RD GENERATION: 63 UIU/ML (ref 0.36–3.74)
VIT B12 SERPL-MCNC: 386 PG/ML (ref 193–986)

## 2024-02-14 PROCEDURE — 1036F TOBACCO NON-USER: CPT | Performed by: SOCIAL WORKER

## 2024-02-14 PROCEDURE — 90791 PSYCH DIAGNOSTIC EVALUATION: CPT | Performed by: SOCIAL WORKER

## 2024-02-14 NOTE — TELEPHONE ENCOUNTER
Attempted to call patient regarding lab results. Patient did not answer, and voice mail is full. I will attempt to send patient [employer] message.       ----- Message from Karen Soria MD sent at 2/14/2024  1:06 PM EST -----  Please have patient contact PCP. Her TSH is abnormally high and she will need to discuss options of treatment.     Karen Soria MD  Bariatric & Minimally Invasive Surgery  [facility]  2/14/2024 1:05 PM

## 2024-02-14 NOTE — PROGRESS NOTES
people could have noticed. Or the opposite - being so fidgety or restless that you have been moving around a lot more than usual 0     Thoughts that you would be better off dead, or of hurting yourself in some way 0     PHQ-2 Score 0 0 0   PHQ-9 Total Score 7 0 0          2/14/2024     2:00 PM   CRISTIANA-7 SCREENING   Feeling nervous, anxious, or on edge Not at all   Not being able to stop or control worrying Not at all   Worrying too much about different things Not at all   Trouble relaxing Not at all   Being so restless that it is hard to sit still Not at all   Becoming easily annoyed or irritable Several days   Feeling afraid as if something awful might happen Not at all   CRISTIANA-7 Total Score 1          WALLACE Gustafson-VIOLETTE        Date:  2/14/2024

## 2024-02-15 ENCOUNTER — CLINICAL DOCUMENTATION (OUTPATIENT)
Dept: SURGERY | Age: 29
End: 2024-02-15

## 2024-02-15 DIAGNOSIS — Z62.819 HISTORY OF ABUSE IN CHILDHOOD: Primary | ICD-10-CM

## 2024-02-16 ENCOUNTER — OFFICE VISIT (OUTPATIENT)
Dept: FAMILY MEDICINE CLINIC | Facility: CLINIC | Age: 29
End: 2024-02-16

## 2024-02-16 ENCOUNTER — TELEPHONE (OUTPATIENT)
Dept: SURGERY | Age: 29
End: 2024-02-16

## 2024-02-16 VITALS
WEIGHT: 293 LBS | BODY MASS INDEX: 48.82 KG/M2 | DIASTOLIC BLOOD PRESSURE: 84 MMHG | RESPIRATION RATE: 18 BRPM | TEMPERATURE: 98 F | SYSTOLIC BLOOD PRESSURE: 134 MMHG | OXYGEN SATURATION: 96 % | HEIGHT: 65 IN | HEART RATE: 82 BPM

## 2024-02-16 DIAGNOSIS — E66.01 OBESITY, MORBID (HCC): ICD-10-CM

## 2024-02-16 DIAGNOSIS — C73 PAPILLARY THYROID CARCINOMA (HCC): Primary | ICD-10-CM

## 2024-02-16 DIAGNOSIS — R79.89 ELEVATED SERUM CREATININE: ICD-10-CM

## 2024-02-16 DIAGNOSIS — D58.2 ELEVATED HEMOGLOBIN (HCC): ICD-10-CM

## 2024-02-16 LAB
ANION GAP SERPL CALC-SCNC: 5 MMOL/L (ref 2–11)
BASOPHILS # BLD: 0.1 K/UL (ref 0–0.2)
BASOPHILS NFR BLD: 1 % (ref 0–2)
BUN SERPL-MCNC: 15 MG/DL (ref 6–23)
CALCIUM SERPL-MCNC: 9.9 MG/DL (ref 8.3–10.4)
CHLORIDE SERPL-SCNC: 107 MMOL/L (ref 103–113)
CO2 SERPL-SCNC: 28 MMOL/L (ref 21–32)
CREAT SERPL-MCNC: 1 MG/DL (ref 0.6–1)
DIFFERENTIAL METHOD BLD: ABNORMAL
EOSINOPHIL # BLD: 0.1 K/UL (ref 0–0.8)
EOSINOPHIL NFR BLD: 1 % (ref 0.5–7.8)
ERYTHROCYTE [DISTWIDTH] IN BLOOD BY AUTOMATED COUNT: 13.1 % (ref 11.9–14.6)
GLUCOSE SERPL-MCNC: 103 MG/DL (ref 65–100)
HCT VFR BLD AUTO: 45.3 % (ref 35.8–46.3)
HGB BLD-MCNC: 14.9 G/DL (ref 11.7–15.4)
IMM GRANULOCYTES # BLD AUTO: 0.1 K/UL (ref 0–0.5)
IMM GRANULOCYTES NFR BLD AUTO: 1 % (ref 0–5)
LYMPHOCYTES # BLD: 2 K/UL (ref 0.5–4.6)
LYMPHOCYTES NFR BLD: 27 % (ref 13–44)
MCH RBC QN AUTO: 28.8 PG (ref 26.1–32.9)
MCHC RBC AUTO-ENTMCNC: 32.9 G/DL (ref 31.4–35)
MCV RBC AUTO: 87.5 FL (ref 82–102)
MONOCYTES # BLD: 0.5 K/UL (ref 0.1–1.3)
MONOCYTES NFR BLD: 7 % (ref 4–12)
NEUTS SEG # BLD: 4.7 K/UL (ref 1.7–8.2)
NEUTS SEG NFR BLD: 63 % (ref 43–78)
NRBC # BLD: 0 K/UL (ref 0–0.2)
PLATELET # BLD AUTO: 299 K/UL (ref 150–450)
PMV BLD AUTO: 9.2 FL (ref 9.4–12.3)
POTASSIUM SERPL-SCNC: 3.9 MMOL/L (ref 3.5–5.1)
RBC # BLD AUTO: 5.18 M/UL (ref 4.05–5.2)
SODIUM SERPL-SCNC: 140 MMOL/L (ref 136–146)
T4 FREE SERPL-MCNC: 0.8 NG/DL (ref 0.78–1.46)
TSH W FREE THYROID IF ABNORMAL: 56.3 UIU/ML (ref 0.36–3.74)
WBC # BLD AUTO: 7.4 K/UL (ref 4.3–11.1)

## 2024-02-16 NOTE — TELEPHONE ENCOUNTER
Called and spoke with patient, discussed results. Patient states that she has a follow up with PCP this morning.       ----- Message from Karen Soria MD sent at 2/14/2024  1:06 PM EST -----  Please have patient contact PCP. Her TSH is abnormally high and she will need to discuss options of treatment.     Karen Soria MD  Bariatric & Minimally Invasive Surgery  [facility]  2/14/2024 1:05 PM

## 2024-02-16 NOTE — PROGRESS NOTES
General: No swelling or tenderness. Normal range of motion.      Cervical back: Normal range of motion and neck supple.   Skin:     General: Skin is warm and dry.   Neurological:      General: No focal deficit present.      Mental Status: She is alert. Mental status is at baseline.   Psychiatric:         Mood and Affect: Mood normal.         Behavior: Behavior normal.              An electronic signature was used to authenticate this note.    --Austin Sorensen Jr, MD

## 2024-02-18 DIAGNOSIS — E89.0 POSTSURGICAL HYPOTHYROIDISM: ICD-10-CM

## 2024-02-18 LAB
THYROGLOB AB SERPL-ACNC: <1 IU/ML (ref 0–0.9)
THYROGLOBULIN: 22.3 NG/ML (ref 1.5–38.5)

## 2024-02-18 RX ORDER — LEVOTHYROXINE SODIUM 0.12 MG/1
250 TABLET ORAL DAILY
Qty: 180 TABLET | Refills: 4 | Status: SHIPPED | OUTPATIENT
Start: 2024-02-18

## 2024-02-19 LAB
COTININE SERPL-MCNC: <1 NG/ML
NICOTINE SERPL-MCNC: <1 NG/ML

## 2024-02-23 NOTE — PROGRESS NOTES
[facility] INITIAL ASSESSMENT    Nutrition Assessment:  Anthropometrics:    Ht: 5’5”, wt: 354#, 236% IBW, 59.41 BMI    Macronutrient needs assessment   EER: 1952-4094 kcal/d (14-16 kcal/kg ABW)    MSJ x 1.3-500 = 2540 kcal/d (sedentary AF)   EPR: 68-102g pro (1.0-1.5 gm pro/kg IBW)   CHO: ~317 g CHO/d (50% EER, ~7 servings CHO/meal)   H2O: 1mL/kcal or per MD recommendations     Support:  Pt has told , mother, and Yazidi members - to be good support.  Reminded pt about [facility] support group and encouraged attendance.      Motivation:  High. Reports 4-10 previous weight loss attempts including medication (semaglutide) and self-supervised diet with exercise.    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    Exercise Assessment:   PAR-Q: No contraindications to exercise  Medical:     Pain or injuries (present): None    Past surgeries related to mobility: None  Exercise equipment at home: None    Gym Membership: [employer]   Current Exercise Routine: Currently none  Assessment Exercise Goal: Begin regular exercise routine and increase as able    Nutrition Diagnosis:  Morbid obesity R/T excessive energy intake and yoyo dieting as evidenced by BMI = 59.41 and 236 % IBW.      Nutrition Intervention:   Diet Rx - Low-moderate calorie intake (~2340 kcal/day).  Work on eating 3 meals/d and meal balance.   Modify distribution, type or amount of food and 
MG (72251 UT) CAPS capsule          Assessment/Plan:    Amira Navarro is a 28 y.o. female is here for monthly follow-up visit prior to bariatric surgery with medical co-morbidities related to morbid obesity.    Patient is a good candidate for bariatric surgery and meets NIH criteria for weight loss surgery. In detail, discussed risks and benefits of laparoscopic di-en-y gastric bypass. Reviewed information and expectations regarding the pre-operative period, the bariatric procedure, and postoperative period. Stressed with patient importance of understanding bariatric surgery is a tool and will not work if patient does not continue with healthy lifestyle changes including regular exercise and high protein, low calorie, low carb diet. Patient was seen by the dietitian today for continued evaluation and management regarding lifestyle and dietary changes. Reviewed assessment and plan in detail. Patient verbalized understanding. Questions answered.    Recommendations: Continue to work on dietary changes over the next couple of weeks, follow up in one month. Complete remaining work up requirements as listed above. We did discuss her blood work today, and she is working with her PCP regarding the elevated TSH level. We also discussed her vitamin D level, and she will begin 50,000 units once per week for a month.     Georgiana Solis PA-C  Bariatric Surgery  2/26/2024    Counseling time:counseling time more than 50% of visit: 30 minutes: I spent this time preparing to see patient (including chart review and preparation), obtaining and/or reviewing additional medical history, performing a physical exam and evaluation, documenting clinical information in the electronic health record, independently interpreting results, communicating results to patient, family or caregiver, and/or coordinating care.

## 2024-02-26 ENCOUNTER — OFFICE VISIT (OUTPATIENT)
Dept: SURGERY | Age: 29
End: 2024-02-26
Payer: MEDICARE

## 2024-02-26 VITALS
HEART RATE: 76 BPM | DIASTOLIC BLOOD PRESSURE: 80 MMHG | SYSTOLIC BLOOD PRESSURE: 135 MMHG | BODY MASS INDEX: 48.82 KG/M2 | WEIGHT: 293 LBS | HEIGHT: 65 IN

## 2024-02-26 DIAGNOSIS — E89.0 POSTSURGICAL HYPOTHYROIDISM: ICD-10-CM

## 2024-02-26 DIAGNOSIS — Z71.82 EXERCISE COUNSELING: ICD-10-CM

## 2024-02-26 DIAGNOSIS — Z87.19 HX OF GASTROESOPHAGEAL REFLUX (GERD): ICD-10-CM

## 2024-02-26 DIAGNOSIS — G93.2 IIH (IDIOPATHIC INTRACRANIAL HYPERTENSION): ICD-10-CM

## 2024-02-26 DIAGNOSIS — Z71.3 DIETARY COUNSELING: ICD-10-CM

## 2024-02-26 DIAGNOSIS — E66.01 MORBID OBESITY (HCC): Primary | ICD-10-CM

## 2024-02-26 DIAGNOSIS — E55.9 VITAMIN D DEFICIENCY: ICD-10-CM

## 2024-02-26 DIAGNOSIS — E66.01 OBESITY, MORBID, BMI 50 OR HIGHER (HCC): ICD-10-CM

## 2024-02-26 PROCEDURE — 99214 OFFICE O/P EST MOD 30 MIN: CPT | Performed by: PHYSICIAN ASSISTANT

## 2024-02-26 RX ORDER — ERGOCALCIFEROL 1.25 MG/1
50000 CAPSULE ORAL WEEKLY
Qty: 4 CAPSULE | Refills: 0 | Status: SHIPPED | OUTPATIENT
Start: 2024-02-26

## 2024-03-18 ENCOUNTER — OFFICE VISIT (OUTPATIENT)
Dept: ENDOCRINOLOGY | Age: 29
End: 2024-03-18
Payer: MEDICARE

## 2024-03-18 VITALS
BODY MASS INDEX: 60.41 KG/M2 | HEART RATE: 82 BPM | DIASTOLIC BLOOD PRESSURE: 102 MMHG | SYSTOLIC BLOOD PRESSURE: 144 MMHG | OXYGEN SATURATION: 97 % | WEIGHT: 293 LBS

## 2024-03-18 DIAGNOSIS — E89.0 POSTSURGICAL HYPOTHYROIDISM: ICD-10-CM

## 2024-03-18 DIAGNOSIS — C73 PAPILLARY THYROID CARCINOMA (HCC): Primary | ICD-10-CM

## 2024-03-18 PROCEDURE — 99214 OFFICE O/P EST MOD 30 MIN: CPT | Performed by: INTERNAL MEDICINE

## 2024-03-18 RX ORDER — LEVOTHYROXINE SODIUM 0.12 MG/1
125 TABLET ORAL DAILY
Qty: 30 TABLET | Refills: 5 | Status: SHIPPED | OUTPATIENT
Start: 2024-03-18

## 2024-03-18 RX ORDER — LEVOTHYROXINE SODIUM 0.15 MG/1
150 TABLET ORAL DAILY
Qty: 30 TABLET | Refills: 5 | Status: SHIPPED | OUTPATIENT
Start: 2024-03-18

## 2024-03-18 ASSESSMENT — ENCOUNTER SYMPTOMS
DIARRHEA: 0
ROS SKIN COMMENTS: SHE REPORTS HAIR LOSS.
CONSTIPATION: 0

## 2024-03-19 NOTE — PROGRESS NOTES
Osman Munoz MD, [facility]  [address] [place] [address], [address]  [place] [address]  Phone [phone]  Facsimile [phone]          Amira Navarro is a 28 y.o. female seen 3/18/2024 for follow-up of thyroid cancer and hypothyroidism (has not been here since 5/2023)        ASSESSMENT AND PLAN:    1. Papillary thyroid carcinoma (HCC)  Left lobe papillary thyroid carcinoma measuring 2.2 cm status post total thyroidectomy 2/2023.  Given the presence of microscopic extrathyroidal extension and a microscopically positive surgical margin, I recommended radioactive iodine remnant ablation would be reasonable.  She had concerns about the logistics of radioactive iodine because she has 3 small children at home.  Unfortunately she never followed up until today.  A recent thyroid globulin level was quite elevated, concerning for persistent disease.  I will order a neck ultrasound and CT chest without contrast at this time.  She likely needs radioactive iodine therapy but she wants to wait and see the results of her neck ultrasound and chest CT.      2. Postsurgical hypothyroidism  Goal TSH less than 0.1 for now.  She is under replaced and I will increase her levothyroxine as below.  Follow up in 2 months.    - levothyroxine (SYNTHROID) 125 MCG tablet; Take 1 tablet by mouth daily Total daily dose 275 mcg  Dispense: 30 tablet; Refill: 5  - levothyroxine (SYNTHROID) 150 MCG tablet; Take 1 tablet by mouth Daily Total daily dose 275 mcg  Dispense: 30 tablet; Refill: 5      Follow-up and Dispositions    Return in about 2 months (around 5/18/2024), or Friday afternoon is okay.         HISTORY OF PRESENT ILLNESS:    THYROID CANCER    Presentation: Left thyroid nodule status post FNA biopsy revealing papillary thyroid carcinoma, status post total thyroidectomy 2/20/2023 with Dr. Holden (pathology revealed left lobe papillary thyroid carcinoma measuring 2.2 x 2.1 x 2 cm,

## 2024-03-27 NOTE — PROGRESS NOTES
one month. Complete remaining work up requirements as listed above. We discussed her plan of action regarding her thyroid, and she will need to complete the biopsy to determine the next step of action and her surgery timeline. She will need to schedule surgery 1 year out if the results are thyroid cancer.     Georgiana Solis PA-C  Bariatric Surgery  3/29/2024    Counseling time:counseling time more than 50% of visit: 20 minutes: I spent this time preparing to see patient (including chart review and preparation), obtaining and/or reviewing additional medical history, performing a physical exam and evaluation, documenting clinical information in the electronic health record, independently interpreting results, communicating results to patient, family or caregiver, and/or coordinating care.

## 2024-03-28 ENCOUNTER — PATIENT MESSAGE (OUTPATIENT)
Dept: ENDOCRINOLOGY | Age: 29
End: 2024-03-28

## 2024-03-28 ENCOUNTER — HOSPITAL ENCOUNTER (OUTPATIENT)
Dept: CT IMAGING | Age: 29
Discharge: HOME | End: 2024-03-28
Attending: INTERNAL MEDICINE
Payer: MEDICARE

## 2024-03-28 ENCOUNTER — HOSPITAL ENCOUNTER (OUTPATIENT)
Dept: ULTRASOUND IMAGING | Age: 29
End: 2024-03-28
Attending: INTERNAL MEDICINE
Payer: MEDICARE

## 2024-03-28 DIAGNOSIS — C73 PAPILLARY THYROID CARCINOMA (HCC): Primary | ICD-10-CM

## 2024-03-28 DIAGNOSIS — C73 PAPILLARY THYROID CARCINOMA (HCC): ICD-10-CM

## 2024-03-28 PROCEDURE — 76536 US EXAM OF HEAD AND NECK: CPT

## 2024-03-28 PROCEDURE — 71250 CT THORAX DX C-: CPT

## 2024-03-29 ENCOUNTER — OFFICE VISIT (OUTPATIENT)
Age: 29
End: 2024-03-29
Payer: MEDICARE

## 2024-03-29 VITALS
HEART RATE: 92 BPM | WEIGHT: 293 LBS | SYSTOLIC BLOOD PRESSURE: 144 MMHG | HEIGHT: 65 IN | BODY MASS INDEX: 48.82 KG/M2 | DIASTOLIC BLOOD PRESSURE: 112 MMHG

## 2024-03-29 DIAGNOSIS — Z87.19 HX OF GASTROESOPHAGEAL REFLUX (GERD): ICD-10-CM

## 2024-03-29 DIAGNOSIS — E66.01 MORBID OBESITY (HCC): Primary | ICD-10-CM

## 2024-03-29 DIAGNOSIS — E89.0 POSTSURGICAL HYPOTHYROIDISM: ICD-10-CM

## 2024-03-29 DIAGNOSIS — Z71.3 DIETARY COUNSELING: ICD-10-CM

## 2024-03-29 DIAGNOSIS — E66.01 OBESITY, MORBID, BMI 50 OR HIGHER (HCC): ICD-10-CM

## 2024-03-29 DIAGNOSIS — G93.2 IIH (IDIOPATHIC INTRACRANIAL HYPERTENSION): ICD-10-CM

## 2024-03-29 DIAGNOSIS — Z71.82 EXERCISE COUNSELING: ICD-10-CM

## 2024-03-29 PROCEDURE — 99213 OFFICE O/P EST LOW 20 MIN: CPT | Performed by: PHYSICIAN ASSISTANT

## 2024-04-09 ENCOUNTER — TELEPHONE (OUTPATIENT)
Dept: ENDOCRINOLOGY | Age: 29
End: 2024-04-09

## 2024-04-09 NOTE — TELEPHONE ENCOUNTER
Pt called needing the number for the plae where she will be getting her biopsy done at. I called her back letting her know the phone number is :  [facility]  [facility] [address] [place] [id number]         Loc/POS:                Phone: [phone]         Pt expressed understanding.

## 2024-04-10 DIAGNOSIS — C73 PAPILLARY THYROID CARCINOMA (HCC): Primary | ICD-10-CM

## 2024-04-23 ENCOUNTER — TELEMEDICINE (OUTPATIENT)
Dept: SURGERY | Age: 29
End: 2024-04-23

## 2024-04-23 DIAGNOSIS — Z71.3 NUTRITIONAL COUNSELING: Primary | ICD-10-CM

## 2024-04-23 NOTE — PROGRESS NOTES
Spoke to pt via telephone.    Pt reports eating at least 3 x daily with lean protein focus - has been reducing CHO foods.Pt reports eliminating straws and has been practicing mindful eating tactics at meal times.    Pt reports having an Oncology appointment on Thursday - will follow up with pt in May.

## 2024-04-25 ENCOUNTER — HOSPITAL ENCOUNTER (OUTPATIENT)
Dept: ULTRASOUND IMAGING | Age: 29
Discharge: HOME | End: 2024-04-25
Attending: INTERNAL MEDICINE
Payer: MEDICARE

## 2024-04-25 VITALS
HEIGHT: 65 IN | SYSTOLIC BLOOD PRESSURE: 145 MMHG | OXYGEN SATURATION: 96 % | WEIGHT: 293 LBS | TEMPERATURE: 97.4 F | DIASTOLIC BLOOD PRESSURE: 77 MMHG | HEART RATE: 74 BPM | BODY MASS INDEX: 48.82 KG/M2 | RESPIRATION RATE: 18 BRPM

## 2024-04-25 DIAGNOSIS — C73 PAPILLARY THYROID CARCINOMA (HCC): ICD-10-CM

## 2024-04-25 PROCEDURE — 76536 US EXAM OF HEAD AND NECK: CPT | Performed by: RADIOLOGY

## 2024-04-25 PROCEDURE — 76536 US EXAM OF HEAD AND NECK: CPT

## 2024-04-25 NOTE — DISCHARGE INSTRUCTIONS
If you have any questions about your procedure, please call the Interventional Radiology department at [phone].      After business hours (5pm) and weekends, call the answering service at [phone] and ask for the Radiologist on call to be paged.            Si tiene Preguntas acerca del procedimiento, por favor llame al departamento de Radiología Intervencional al [phone].      Después de horas de oficina (5 pm) y los fines de semana, llamar al servicio de llamadas al [phone] y pregunte por el Radiologo de leeanne.

## 2024-05-02 ENCOUNTER — TELEPHONE (OUTPATIENT)
Dept: ENDOCRINOLOGY | Age: 29
End: 2024-05-02

## 2024-05-02 NOTE — TELEPHONE ENCOUNTER
Pt called stating she does not want to do the FERNANDEZ. She want to know if she can proceed with the gastric sleeve.    Please advise

## 2024-05-02 NOTE — TELEPHONE ENCOUNTER
I would not recommend that she undergo elective surgery until she has her thyroid levels under control.

## 2024-05-08 DIAGNOSIS — E89.0 POSTSURGICAL HYPOTHYROIDISM: ICD-10-CM

## 2024-05-08 DIAGNOSIS — C73 PAPILLARY THYROID CARCINOMA (HCC): ICD-10-CM

## 2024-05-09 ENCOUNTER — TELEPHONE (OUTPATIENT)
Dept: ENDOCRINOLOGY | Age: 29
End: 2024-05-09

## 2024-05-09 DIAGNOSIS — E89.0 POSTSURGICAL HYPOTHYROIDISM: ICD-10-CM

## 2024-05-09 LAB
T4 FREE SERPL-MCNC: 1.3 NG/DL (ref 0.9–1.7)
TSH W FREE THYROID IF ABNORMAL: 15.1 UIU/ML (ref 0.27–4.2)

## 2024-05-09 RX ORDER — LEVOTHYROXINE SODIUM 0.15 MG/1
300 TABLET ORAL DAILY
Qty: 60 TABLET | Refills: 5 | Status: SHIPPED | OUTPATIENT
Start: 2024-05-09

## 2024-05-09 NOTE — TELEPHONE ENCOUNTER
Her thyroid levels are getting better, but she still needs a higher dose.  She needs to make a follow up appointment.

## 2024-05-13 LAB
THYROGLOB AB SERPL-ACNC: <1 IU/ML (ref 0–0.9)
THYROGLOBULIN: 17.3 NG/ML (ref 1.5–38.5)

## 2024-05-20 ENCOUNTER — OFFICE VISIT (OUTPATIENT)
Dept: ENDOCRINOLOGY | Age: 29
End: 2024-05-20
Payer: MEDICARE

## 2024-05-20 VITALS
DIASTOLIC BLOOD PRESSURE: 88 MMHG | WEIGHT: 293 LBS | HEART RATE: 72 BPM | BODY MASS INDEX: 58.88 KG/M2 | SYSTOLIC BLOOD PRESSURE: 142 MMHG | OXYGEN SATURATION: 97 %

## 2024-05-20 DIAGNOSIS — E89.0 POSTSURGICAL HYPOTHYROIDISM: ICD-10-CM

## 2024-05-20 DIAGNOSIS — C73 PAPILLARY THYROID CARCINOMA (HCC): Primary | ICD-10-CM

## 2024-05-20 PROCEDURE — 99214 OFFICE O/P EST MOD 30 MIN: CPT | Performed by: INTERNAL MEDICINE

## 2024-05-20 RX ORDER — THYROTROPIN ALFA 0.9 MG/ML
INJECTION, POWDER, FOR SOLUTION INTRAMUSCULAR
Qty: 2 EACH | Refills: 0 | Status: SHIPPED | OUTPATIENT
Start: 2024-05-20

## 2024-05-20 RX ORDER — LEVOTHYROXINE SODIUM 0.15 MG/1
300 TABLET ORAL DAILY
Qty: 60 TABLET | Refills: 5 | Status: SHIPPED | OUTPATIENT
Start: 2024-05-20

## 2024-05-20 ASSESSMENT — ENCOUNTER SYMPTOMS
CONSTIPATION: 0
ROS SKIN COMMENTS: SHE REPORTS HAIR LOSS.
DIARRHEA: 0

## 2024-05-20 NOTE — PROGRESS NOTES
mouth Daily Total daily dose 300 mcg  Dispense: 60 tablet; Refill: 5      Follow-up and Dispositions    Return in about 3 months (around 8/20/2024), or Friday afternoon is okay.         HISTORY OF PRESENT ILLNESS:    THYROID CANCER    Presentation: Left thyroid nodule status post FNA biopsy revealing papillary thyroid carcinoma, status post total thyroidectomy 2/20/2023 with Dr. Holden (pathology revealed left lobe papillary thyroid carcinoma measuring 2.2 x 2.1 x 2 cm, partial capsular invasion with tumor focally invading through the capsule into the surrounding adipose tissue, microscopically positive margin, no vascular invasion, no invasion of adjacent structures; pT2 NX).    Thyroid Cancer Risk Factors:  Her brother had metastatic thyroid carcinoma to cervical lymph nodes requiring radioactive iodine.  Her maternal grandmother had thyroid cancer.  There is no history of radiation to the head/neck.     Surgical complications: None.    Current symptoms: Denies neck lumps, lymphadenopathy.  She has some hoarseness and dysphagia since surgery.    Imaging:  Thyroid ultrasound 9/29/2021: Right lobe 5.9 x 1.5 x 1.2 cm, homogeneous echotexture, no nodules.  Left lobe 6.1 x 2.4 x 2.6 cm.  There is a mostly hypoechoic nodule measuring 2.7 x 2.5 cm with irregular margins and sate echogenic areas which may represent calcifications.  Limited thyroid ultrasound 11/17/2022: In the mid to inferior left lobe there is a solid, heterogeneous nodule with isoechoic and hypoechoic components measuring 1.84 x 2.27 x 2.68 cm containing multiple punctate echogenic foci suspicious for microcalcifications (TR 5).  Examination of the central and lateral cervical compartments reveals no abnormal lymph nodes bilaterally.  CT chest without contrast 3/28/2024: No evidence of thoracic metastatic disease.  Neck ultrasound 3/28/2024: Hypoechoic area in the left thyroid bed measuring 1.3 x 0.9 x 0.3 cm with posterior acoustic enhancement

## 2024-06-07 ENCOUNTER — TELEPHONE (OUTPATIENT)
Dept: ENDOCRINOLOGY | Age: 29
End: 2024-06-07

## 2024-06-07 NOTE — TELEPHONE ENCOUNTER
Having normal TSH levels will make it safer for her to have gastric bypass surgery, but that will not treat her thyroid cancer.

## 2024-06-07 NOTE — TELEPHONE ENCOUNTER
I called the pt to see what dates are good with her to start her FERNANDEZ treatment. She expressed that she will need some time to think about it. She then wants to know if her TSH levels are good then can she get gastric bypass surgery without getting the FERNANDEZ treatment?

## 2024-06-18 NOTE — TELEPHONE ENCOUNTER
I have called the Patient 2 times and still have not heard from the Pt. I will wait for the Pt to give us a call to proceed with the FERNANDEZ treatment.

## 2024-06-27 ENCOUNTER — TELEPHONE (OUTPATIENT)
Dept: SURGERY | Age: 29
End: 2024-06-27

## 2024-06-27 DIAGNOSIS — Z71.3 NUTRITIONAL COUNSELING: Primary | ICD-10-CM

## 2024-06-27 NOTE — TELEPHONE ENCOUNTER
Diet recall:  B: Oatmeal  L: Pizza  D: Chicken salad francine    Pt awaiting next steps from Endocrinology. Will follow up with PA and will schedule next steps accordingly.

## 2024-07-18 ENCOUNTER — TELEPHONE (OUTPATIENT)
Dept: SURGERY | Age: 29
End: 2024-07-18

## 2024-07-18 DIAGNOSIS — Z71.3 NUTRITIONAL COUNSELING: Primary | ICD-10-CM

## 2024-07-18 NOTE — TELEPHONE ENCOUNTER
Pt still awaiting next steps from PCP regarding TSH levels.    B: Oatmeal and banana  L: Spaghetti with 2 meatballs  D: Chx and sweet potato    Will continue to follow pt virtually until pt knows next steps from PCP

## 2024-08-15 DIAGNOSIS — E89.0 POSTSURGICAL HYPOTHYROIDISM: ICD-10-CM

## 2024-08-15 DIAGNOSIS — C73 PAPILLARY THYROID CARCINOMA (HCC): ICD-10-CM

## 2024-08-15 LAB — TSH W FREE THYROID IF ABNORMAL: 1.23 UIU/ML (ref 0.27–4.2)

## 2024-08-17 LAB
THYROGLOB AB SERPL-ACNC: <1 IU/ML (ref 0–0.9)
THYROGLOBULIN: 3.9 NG/ML (ref 1.5–38.5)

## 2024-08-19 ENCOUNTER — OFFICE VISIT (OUTPATIENT)
Dept: ENDOCRINOLOGY | Age: 29
End: 2024-08-19
Payer: MEDICARE

## 2024-08-19 VITALS
WEIGHT: 293 LBS | HEIGHT: 65 IN | HEART RATE: 76 BPM | RESPIRATION RATE: 18 BRPM | DIASTOLIC BLOOD PRESSURE: 74 MMHG | BODY MASS INDEX: 48.82 KG/M2 | SYSTOLIC BLOOD PRESSURE: 118 MMHG | OXYGEN SATURATION: 98 %

## 2024-08-19 DIAGNOSIS — E89.0 POSTSURGICAL HYPOTHYROIDISM: ICD-10-CM

## 2024-08-19 DIAGNOSIS — C73 PAPILLARY THYROID CARCINOMA (HCC): Primary | ICD-10-CM

## 2024-08-19 PROCEDURE — 99214 OFFICE O/P EST MOD 30 MIN: CPT | Performed by: INTERNAL MEDICINE

## 2024-08-19 RX ORDER — LEVOTHYROXINE SODIUM 0.15 MG/1
TABLET ORAL
Qty: 65 TABLET | Refills: 5 | Status: SHIPPED | OUTPATIENT
Start: 2024-08-19

## 2024-08-19 ASSESSMENT — ENCOUNTER SYMPTOMS
DIARRHEA: 0
CONSTIPATION: 0
ROS SKIN COMMENTS: SHE REPORTS HAIR LOSS.

## 2024-08-19 NOTE — PROGRESS NOTES
Osman Munoz MD, FACE  [facility]  [address] [place] [address], [address]  [place] [address]  Phone [phone]  Facsimile [phone]          Amira Navarro is a 28 y.o. female seen 8/19/2024 for follow-up of thyroid cancer and hypothyroidism         ASSESSMENT AND PLAN:    1. Papillary thyroid carcinoma (HCC)  Left lobe papillary thyroid carcinoma measuring 2.2 cm status post total thyroidectomy 2/2023.  Given the presence of microscopic extrathyroidal extension and a microscopically positive surgical margin, I recommended radioactive iodine remnant ablation.  Unfortunately, she was lost to follow-up until recently.  Her thyroglobulin level has been elevated.  Chest CT 3/2024 did not reveal any evidence of thoracic metastatic disease.  Neck ultrasound 3/2024 revealed the presence of a possible left thyroid bed cyst and level 6 soft tissue mass.  I referred her to interventional radiology for an FNA biopsy of the level 6 soft tissue mass, but the radiologist was unable to locate the mass; the procedure was therefore aborted.  Her thyroglobulin level has improved with a reduction of her serum thyrotropin level, but it remains elevated.  I have repeatedly recommended radioactive iodine-131 therapy, but she  continues to decline.  She states that she may consider it prior to her gastric sleeve surgery and I asked her to let me know if she is willing to proceed.  If she does not go forward with radioactive iodine therapy, then I will likely order a neck ultrasound at her next visit.    2. Postsurgical hypothyroidism  Goal TSH less than 0.1 for now.  She is under replaced and I will increase her levothyroxine as below.      - levothyroxine (SYNTHROID) 150 MCG tablet; 2 tablets once a day with an extra tablet on Sundays  Dispense: 65 tablet; Refill: 5      Follow-up and Dispositions    Return in about 3 months (around 11/19/2024), or Friday afternoon is okay.

## 2024-08-20 ENCOUNTER — TELEPHONE (OUTPATIENT)
Dept: FAMILY MEDICINE CLINIC | Facility: CLINIC | Age: 29
End: 2024-08-20

## 2024-08-20 NOTE — TELEPHONE ENCOUNTER
Pt is calling to see if you call in       Semaglutie       For weight loss.      If so, she would like to make appt with you to go over it.       Thank you

## 2024-08-20 NOTE — TELEPHONE ENCOUNTER
She needs to make a fasting appt asap, I will check labs etc and see if I can find an indication , but without Diabetes, it is doubtful insurance will cover it.

## 2024-09-12 ENCOUNTER — TELEPHONE (OUTPATIENT)
Dept: ENDOCRINOLOGY | Age: 29
End: 2024-09-12

## 2024-09-18 ENCOUNTER — TELEPHONE (OUTPATIENT)
Dept: FAMILY MEDICINE CLINIC | Facility: CLINIC | Age: 29
End: 2024-09-18

## 2024-09-23 DIAGNOSIS — E89.0 POST-SURGICAL HYPOTHYROIDISM: ICD-10-CM

## 2024-09-23 DIAGNOSIS — Z85.850 HISTORY OF THYROID CANCER: Primary | ICD-10-CM

## 2024-10-03 ENCOUNTER — OFFICE VISIT (OUTPATIENT)
Dept: FAMILY MEDICINE CLINIC | Facility: CLINIC | Age: 29
End: 2024-10-03
Payer: MEDICARE

## 2024-10-03 VITALS
HEART RATE: 93 BPM | RESPIRATION RATE: 18 BRPM | HEIGHT: 66 IN | SYSTOLIC BLOOD PRESSURE: 134 MMHG | TEMPERATURE: 98.6 F | BODY MASS INDEX: 47.09 KG/M2 | WEIGHT: 293 LBS | DIASTOLIC BLOOD PRESSURE: 82 MMHG | OXYGEN SATURATION: 98 %

## 2024-10-03 DIAGNOSIS — C73 PAPILLARY THYROID CARCINOMA (HCC): Primary | ICD-10-CM

## 2024-10-03 DIAGNOSIS — E89.0 POSTSURGICAL HYPOTHYROIDISM: ICD-10-CM

## 2024-10-03 PROCEDURE — 99213 OFFICE O/P EST LOW 20 MIN: CPT | Performed by: FAMILY MEDICINE

## 2024-10-03 ASSESSMENT — ENCOUNTER SYMPTOMS
VOMITING: 0
NAUSEA: 0
COUGH: 0
DIARRHEA: 0
SHORTNESS OF BREATH: 0

## 2024-10-03 NOTE — PROGRESS NOTES
focal deficit present.      Mental Status: She is alert. Mental status is at baseline.   Psychiatric:         Mood and Affect: Mood normal.         Behavior: Behavior normal.        21 min encounter in totoal      An electronic signature was used to authenticate this note.    --Austin Sorensen Jr, MD

## 2024-10-03 NOTE — ASSESSMENT & PLAN NOTE
Needs endo referral  Made referral today  Orders:    External Referral to Endocrinology    Obesity- awaiting gastric sleeve surgery, but needs endo to sign off on it(PER PT)

## (undated) DEVICE — SUTURE ETHLN SZ 3-0 L18IN NONABSORBABLE BLK L24MM PS-1 3/8 1663G

## (undated) DEVICE — BLADE ES ELASTOMERIC COAT INSUL DURABLE BEND UPTO 90DEG

## (undated) DEVICE — STRIP,CLOSURE,WOUND,MEDI-STRIP,1/2X4: Brand: MEDLINE

## (undated) DEVICE — PREMIUM WET SKIN PREP TRAY: Brand: MEDLINE INDUSTRIES, INC.

## (undated) DEVICE — DRAPE,INSTRUMENT,MAGNETIC,10X16: Brand: MEDLINE

## (undated) DEVICE — PROBE 8225101 5PK STD PRASS FL TIP ROHS

## (undated) DEVICE — SUREFIT, DUAL DISPERSIVE ELECTRODE, CONTACT QUALITY MONITOR: Brand: SUREFIT

## (undated) DEVICE — HOOK RETRCT DIA5MM SHRP E STAY DISP FOR LONE STAR SELF RET

## (undated) DEVICE — TOTAL TRAY, DB, 100% SILI FOLEY, 16FR 10: Brand: MEDLINE

## (undated) DEVICE — CORD ES L12FT BPLR FRCP

## (undated) DEVICE — GARMENT,MEDLINE,DVT,INT,CALF,LG, GEN2: Brand: MEDLINE

## (undated) DEVICE — APPLIER CLP L9.38IN M LIG TI DISP STR RNG HNDL LIGACLP

## (undated) DEVICE — PACKING 8004001 NEURAY 200PK 19X19MM: Brand: NEURAY ®

## (undated) DEVICE — KIT PROCEDURE SURG HEAD AND NECK TOTE

## (undated) DEVICE — PAD,NON-ADHERENT,2X3,STERILE,LF,1/PK: Brand: MEDLINE

## (undated) DEVICE — ADHESIVE LIQ 2OZ ADJUNCT FOR DSG MASTISOL

## (undated) DEVICE — DRAPE TWL SURG 16X26IN BLU ORB04] ALLCARE INC]

## (undated) DEVICE — PENCIL ES L3M BTTN SWCH HOLSTER W/ BLDE ELECTRD EDGE

## (undated) DEVICE — SOLUTION IRRIG 1000ML 0.9% SOD CHL USP POUR PLAS BTL

## (undated) DEVICE — SHEARS ENDOSCP L9CM CRV HARM FOCS +

## (undated) DEVICE — SUTURE MCRYL SZ 4-0 L18IN ABSRB UD P-3 L13MM 3/8 CIR PRIM Y494G

## (undated) DEVICE — ELECTRODE PT RET AD L9FT HI MOIST COND ADH HYDRGEL CORDED

## (undated) DEVICE — MARKER SURG SKIN UTIL BLK REG TIP NONSMEARING W/ 6IN RUL

## (undated) DEVICE — GLOVE ORANGE PI 8   MSG9080

## (undated) DEVICE — SPONGE SURG SM 3/8IN WHT PNUT DISECT RADPQ ST

## (undated) DEVICE — KIT EVAC 0.13IN RECT TB DIA10FR 400CC PVC 3 SPR Y CONN DRN

## (undated) DEVICE — SUTURE PERMAHAND SZ 2-0 L12X18IN NONABSORBABLE BLK SILK A185H

## (undated) DEVICE — APPLIER CLP L9.375IN APER 2.1MM CLS L3.8MM 20 SM TI CLP

## (undated) DEVICE — SUTURE VCRL SZ 4-0 L27IN ABSRB UD L26MM SH 1/2 CIR J415H

## (undated) DEVICE — SPONGE LAPAROTOMY W18XL18IN WHITE STRUNG RADIOPAQUE STERILE